# Patient Record
Sex: FEMALE | Race: WHITE | NOT HISPANIC OR LATINO | Employment: FULL TIME | URBAN - METROPOLITAN AREA
[De-identification: names, ages, dates, MRNs, and addresses within clinical notes are randomized per-mention and may not be internally consistent; named-entity substitution may affect disease eponyms.]

---

## 2019-04-16 ENCOUNTER — TRANSCRIBE ORDERS (OUTPATIENT)
Dept: ADMINISTRATIVE | Age: 49
End: 2019-04-16

## 2019-04-16 ENCOUNTER — APPOINTMENT (OUTPATIENT)
Dept: LAB | Age: 49
End: 2019-04-16
Payer: COMMERCIAL

## 2019-04-16 DIAGNOSIS — N39.0 URINARY TRACT INFECTION WITHOUT HEMATURIA, SITE UNSPECIFIED: Primary | ICD-10-CM

## 2019-04-16 DIAGNOSIS — N39.0 URINARY TRACT INFECTION WITHOUT HEMATURIA, SITE UNSPECIFIED: ICD-10-CM

## 2019-04-16 LAB
BACTERIA UR QL AUTO: ABNORMAL /HPF
BILIRUB UR QL STRIP: NEGATIVE
CLARITY UR: ABNORMAL
COLOR UR: YELLOW
GLUCOSE UR STRIP-MCNC: NEGATIVE MG/DL
HGB UR QL STRIP.AUTO: ABNORMAL
HYALINE CASTS #/AREA URNS LPF: ABNORMAL /LPF
KETONES UR STRIP-MCNC: NEGATIVE MG/DL
LEUKOCYTE ESTERASE UR QL STRIP: ABNORMAL
NITRITE UR QL STRIP: POSITIVE
NON-SQ EPI CELLS URNS QL MICRO: ABNORMAL /HPF
PH UR STRIP.AUTO: 6 [PH]
PROT UR STRIP-MCNC: ABNORMAL MG/DL
RBC #/AREA URNS AUTO: ABNORMAL /HPF
SP GR UR STRIP.AUTO: 1.02 (ref 1–1.03)
UROBILINOGEN UR QL STRIP.AUTO: 0.2 E.U./DL
WBC #/AREA URNS AUTO: ABNORMAL /HPF

## 2019-04-16 PROCEDURE — 87077 CULTURE AEROBIC IDENTIFY: CPT

## 2019-04-16 PROCEDURE — 81001 URINALYSIS AUTO W/SCOPE: CPT | Performed by: INTERNAL MEDICINE

## 2019-04-16 PROCEDURE — 87086 URINE CULTURE/COLONY COUNT: CPT

## 2019-04-16 PROCEDURE — 87186 SC STD MICRODIL/AGAR DIL: CPT

## 2019-04-18 LAB — BACTERIA UR CULT: ABNORMAL

## 2019-04-23 ENCOUNTER — OFFICE VISIT (OUTPATIENT)
Dept: GYNECOLOGY | Facility: CLINIC | Age: 49
End: 2019-04-23
Payer: COMMERCIAL

## 2019-04-23 VITALS
HEIGHT: 67 IN | SYSTOLIC BLOOD PRESSURE: 102 MMHG | BODY MASS INDEX: 31.11 KG/M2 | WEIGHT: 198.2 LBS | HEART RATE: 62 BPM | DIASTOLIC BLOOD PRESSURE: 66 MMHG

## 2019-04-23 DIAGNOSIS — Z11.3 ROUTINE SCREENING FOR STI (SEXUALLY TRANSMITTED INFECTION): ICD-10-CM

## 2019-04-23 DIAGNOSIS — B96.89 BV (BACTERIAL VAGINOSIS): Primary | ICD-10-CM

## 2019-04-23 DIAGNOSIS — N76.0 BV (BACTERIAL VAGINOSIS): Primary | ICD-10-CM

## 2019-04-23 PROCEDURE — 99204 OFFICE O/P NEW MOD 45 MIN: CPT | Performed by: NURSE PRACTITIONER

## 2019-04-23 PROCEDURE — 87491 CHLMYD TRACH DNA AMP PROBE: CPT | Performed by: NURSE PRACTITIONER

## 2019-04-23 PROCEDURE — 87210 SMEAR WET MOUNT SALINE/INK: CPT | Performed by: NURSE PRACTITIONER

## 2019-04-23 PROCEDURE — 87591 N.GONORRHOEAE DNA AMP PROB: CPT | Performed by: NURSE PRACTITIONER

## 2019-04-23 RX ORDER — METRONIDAZOLE 7.5 MG/G
1 GEL VAGINAL DAILY
Qty: 5 G | Refills: 0 | Status: SHIPPED | OUTPATIENT
Start: 2019-04-23 | End: 2019-04-28

## 2019-04-23 RX ORDER — LEVOTHYROXINE SODIUM 50 MCG
TABLET ORAL
COMMUNITY
Start: 2019-02-04 | End: 2021-04-30

## 2019-04-25 LAB
C TRACH DNA SPEC QL NAA+PROBE: NEGATIVE
N GONORRHOEA DNA SPEC QL NAA+PROBE: NEGATIVE

## 2019-05-03 LAB
HIV 1+2 AB+HIV1 P24 AG SERPL QL IA: NON REACTIVE
HSV1 IGG SER IA-ACNC: 26.1 INDEX (ref 0–0.9)
HSV1 IGM TITR SER IF: NORMAL TITER
HSV2 IGG SER IA-ACNC: <0.91 INDEX (ref 0–0.9)
HSV2 IGM TITR SER IF: NORMAL TITER
RPR SER QL: NON REACTIVE

## 2019-06-14 ENCOUNTER — TELEPHONE (OUTPATIENT)
Dept: GYNECOLOGY | Facility: CLINIC | Age: 49
End: 2019-06-14

## 2019-06-14 DIAGNOSIS — B96.89 BV (BACTERIAL VAGINOSIS): Primary | ICD-10-CM

## 2019-06-14 DIAGNOSIS — N76.0 BV (BACTERIAL VAGINOSIS): Primary | ICD-10-CM

## 2019-06-14 RX ORDER — METRONIDAZOLE 500 MG/1
500 TABLET ORAL EVERY 12 HOURS SCHEDULED
Qty: 14 TABLET | Refills: 0 | Status: SHIPPED | OUTPATIENT
Start: 2019-06-14 | End: 2019-06-21

## 2019-07-02 ENCOUNTER — ANNUAL EXAM (OUTPATIENT)
Dept: GYNECOLOGY | Facility: CLINIC | Age: 49
End: 2019-07-02
Payer: COMMERCIAL

## 2019-07-02 VITALS
WEIGHT: 199.4 LBS | HEART RATE: 77 BPM | DIASTOLIC BLOOD PRESSURE: 76 MMHG | SYSTOLIC BLOOD PRESSURE: 110 MMHG | HEIGHT: 67 IN | BODY MASS INDEX: 31.3 KG/M2

## 2019-07-02 DIAGNOSIS — B96.89 BV (BACTERIAL VAGINOSIS): ICD-10-CM

## 2019-07-02 DIAGNOSIS — Z12.31 ENCOUNTER FOR SCREENING MAMMOGRAM FOR BREAST CANCER: ICD-10-CM

## 2019-07-02 DIAGNOSIS — N76.0 BV (BACTERIAL VAGINOSIS): ICD-10-CM

## 2019-07-02 DIAGNOSIS — Z01.411 ENCNTR FOR GYN EXAM (GENERAL) (ROUTINE) W ABNORMAL FINDINGS: Primary | ICD-10-CM

## 2019-07-02 DIAGNOSIS — N39.3 SUI (STRESS URINARY INCONTINENCE, FEMALE): ICD-10-CM

## 2019-07-02 PROCEDURE — G0145 SCR C/V CYTO,THINLAYER,RESCR: HCPCS | Performed by: NURSE PRACTITIONER

## 2019-07-02 PROCEDURE — S0612 ANNUAL GYNECOLOGICAL EXAMINA: HCPCS | Performed by: NURSE PRACTITIONER

## 2019-07-02 PROCEDURE — 87624 HPV HI-RISK TYP POOLED RSLT: CPT | Performed by: NURSE PRACTITIONER

## 2019-07-02 PROCEDURE — 87210 SMEAR WET MOUNT SALINE/INK: CPT | Performed by: NURSE PRACTITIONER

## 2019-07-02 RX ORDER — DIPHENOXYLATE HYDROCHLORIDE AND ATROPINE SULFATE 2.5; .025 MG/1; MG/1
1 TABLET ORAL DAILY
COMMUNITY

## 2019-07-02 RX ORDER — CLINDAMYCIN HYDROCHLORIDE 300 MG/1
300 CAPSULE ORAL 2 TIMES DAILY
Qty: 14 CAPSULE | Refills: 0 | Status: SHIPPED | OUTPATIENT
Start: 2019-07-02 | End: 2019-07-09

## 2019-07-02 NOTE — PROGRESS NOTES
Assessment/Plan:     Calcium 1000 mg + 600 IU Vit D daily  Pap with HR HPV Q 3 years-done, annual mammogram, monthly BSE  Exercise 150 minutes per week minimum  Kegels 20 times twice daily  Referred to PF PT for ADRIANA  Bacterial vaginosis noted on wet mount  Rx sent to requested pharmacy  No sex during treatment  Complete all medication as directed  Call with any recurrence of symptoms  PHQ-2 score of 2 which she relates to her lack of sleep  Diagnoses and all orders for this visit:    Encntr for gyn exam (general) (routine) w abnormal findings  -     Liquid-based pap, screening    BV (bacterial vaginosis)  -     clindamycin (CLEOCIN) 300 MG capsule; Take 1 capsule (300 mg total) by mouth 2 (two) times a day for 7 days    Encounter for screening mammogram for breast cancer  -     Mammo screening bilateral w 3d & cad; Future    ADRIANA (stress urinary incontinence, female)  -     Ambulatory referral to Physical Therapy; Future    Other orders  -     multivitamin (THERAGRAN) TABS; Take 1 tablet by mouth daily  -     Omega-3 Fatty Acids (FISH OIL PO); Take by mouth  -     Glucosamine HCl (GLUCOSAMINE PO); Take by mouth  -     MAGNESIUM PO; Take by mouth              Subjective:      Patient ID: Isha Temple is a 57 y o  female  Isha Temple is a 57 y o  female who is here today for her annual visit  No health concerns  Was treated recently for BV  Concerned about it occurring again and wants to be checked  Does not typically have a vaginal discharge and does not have any currently  Taking daily probiotic  Monthly menses x 4-5 days with mod flow  Menses is acceptable  Isha Temple is sexually active with male partner of 4 months  No condom use  Both were tested for STI's  Noticing light pink discharge post coital x 2 weeks  Does not occur every time  Exercising 2-3 times per week  Dealign with insomnia often  Admits to intermittent ADRIANA         The following portions of the patient's history were reviewed and updated as appropriate: allergies, current medications, past family history, past medical history, past social history, past surgical history and problem list     Review of Systems   Constitutional: Negative  Negative for activity change, appetite change, chills, diaphoresis, fatigue, fever and unexpected weight change  HENT: Negative for congestion, dental problem, sneezing, sore throat and trouble swallowing  Eyes: Negative for visual disturbance  Respiratory: Negative for chest tightness and shortness of breath  Cardiovascular: Negative for chest pain and leg swelling  Gastrointestinal: Negative for abdominal pain, constipation, diarrhea, nausea and vomiting  Genitourinary: Positive for vaginal bleeding and vaginal discharge  Negative for difficulty urinating, dyspareunia, dysuria, frequency, hematuria, menstrual problem, pelvic pain, urgency and vaginal pain  Musculoskeletal: Negative for back pain and neck pain  Skin: Negative  Allergic/Immunologic: Negative  Neurological: Negative for weakness and headaches  Hematological: Negative for adenopathy  Psychiatric/Behavioral: Negative  Objective:      /76 (BP Location: Left arm, Patient Position: Sitting, Cuff Size: Standard)   Pulse 77   Ht 5' 7" (1 702 m)   Wt 90 4 kg (199 lb 6 4 oz)   LMP 06/14/2019   BMI 31 23 kg/m²          Physical Exam   Constitutional: She is oriented to person, place, and time  Vital signs are normal  She appears well-developed and well-nourished  HENT:   Head: Normocephalic and atraumatic  Eyes: Right eye exhibits no discharge  Left eye exhibits no discharge  Neck: Trachea normal and normal range of motion  Neck supple  No thyromegaly present  Cardiovascular: Normal rate, regular rhythm, normal heart sounds and intact distal pulses     Pulmonary/Chest: Effort normal and breath sounds normal  Right breast exhibits no inverted nipple, no mass, no nipple discharge, no skin change and no tenderness  Left breast exhibits no inverted nipple, no mass, no nipple discharge, no skin change and no tenderness  No breast tenderness, discharge or bleeding  Breasts are symmetrical    Abdominal: Soft  Normal appearance  Genitourinary: Vagina normal and uterus normal  Rectal exam shows no external hemorrhoid  No breast tenderness, discharge or bleeding  Pelvic exam was performed with patient supine  No labial fusion  There is no rash, tenderness, lesion or injury on the right labia  There is no rash, tenderness, lesion or injury on the left labia  Cervix exhibits no motion tenderness, no discharge and no friability  Right adnexum displays no mass, no tenderness and no fullness  Left adnexum displays no mass, no tenderness and no fullness  No erythema, tenderness or bleeding in the vagina  No foreign body in the vagina  No signs of injury around the vagina  No vaginal discharge found  Genitourinary Comments: Guarded during exam  Pelvic tone 4/5   Musculoskeletal: Normal range of motion  Lymphadenopathy:        Head (right side): No submental, no submandibular and no tonsillar adenopathy present  Head (left side): No submental, no submandibular and no tonsillar adenopathy present  She has no cervical adenopathy  She has no axillary adenopathy  No inguinal adenopathy noted on the right or left side  Right: No inguinal adenopathy present  Left: No inguinal adenopathy present  Neurological: She is alert and oriented to person, place, and time  Skin: Skin is warm and dry  Psychiatric: She has a normal mood and affect  Nursing note and vitals reviewed

## 2019-07-02 NOTE — PATIENT INSTRUCTIONS
Calcium 1000 mg + 600 IU Vit D daily  Pap with HR HPV Q 3 years-done, annual mammogram, monthly BSE  Exercise 150 minutes per week minimum  Kegels 20 times twice daily  Referred to PF PT  Bacterial vaginosis noted on wet mount  Rx sent to requested pharmacy  No sex during treatment  Complete all medication as directed  Call with any recurrence of symptoms

## 2019-07-05 LAB
HPV HR 12 DNA CVX QL NAA+PROBE: NEGATIVE
HPV16 DNA CVX QL NAA+PROBE: NEGATIVE
HPV18 DNA CVX QL NAA+PROBE: POSITIVE

## 2019-07-10 LAB
LAB AP GYN PRIMARY INTERPRETATION: NORMAL
Lab: NORMAL

## 2019-07-23 ENCOUNTER — OFFICE VISIT (OUTPATIENT)
Dept: GYNECOLOGY | Facility: CLINIC | Age: 49
End: 2019-07-23
Payer: COMMERCIAL

## 2019-07-23 VITALS
SYSTOLIC BLOOD PRESSURE: 112 MMHG | DIASTOLIC BLOOD PRESSURE: 76 MMHG | HEART RATE: 87 BPM | WEIGHT: 200.4 LBS | BODY MASS INDEX: 31.39 KG/M2

## 2019-07-23 DIAGNOSIS — N89.8 LEUKORRHEA: ICD-10-CM

## 2019-07-23 DIAGNOSIS — R35.0 URINARY FREQUENCY: Primary | ICD-10-CM

## 2019-07-23 LAB
SL AMB  POCT GLUCOSE, UA: ABNORMAL
SL AMB LEUKOCYTE ESTERASE,UA: ABNORMAL
SL AMB POCT BILIRUBIN,UA: ABNORMAL
SL AMB POCT BLOOD,UA: ABNORMAL
SL AMB POCT CLARITY,UA: ABNORMAL
SL AMB POCT COLOR,UA: YELLOW
SL AMB POCT KETONES,UA: ABNORMAL
SL AMB POCT NITRITE,UA: ABNORMAL
SL AMB POCT PH,UA: 6
SL AMB POCT SPECIFIC GRAVITY,UA: 1.01
SL AMB POCT URINE PROTEIN: ABNORMAL
SL AMB POCT UROBILINOGEN: 0.2

## 2019-07-23 PROCEDURE — 87147 CULTURE TYPE IMMUNOLOGIC: CPT | Performed by: NURSE PRACTITIONER

## 2019-07-23 PROCEDURE — 87181 SC STD AGAR DILUTION PER AGT: CPT | Performed by: NURSE PRACTITIONER

## 2019-07-23 PROCEDURE — 87210 SMEAR WET MOUNT SALINE/INK: CPT | Performed by: NURSE PRACTITIONER

## 2019-07-23 PROCEDURE — 87086 URINE CULTURE/COLONY COUNT: CPT | Performed by: NURSE PRACTITIONER

## 2019-07-23 PROCEDURE — 81002 URINALYSIS NONAUTO W/O SCOPE: CPT | Performed by: NURSE PRACTITIONER

## 2019-07-23 PROCEDURE — 99214 OFFICE O/P EST MOD 30 MIN: CPT | Performed by: NURSE PRACTITIONER

## 2019-07-23 RX ORDER — NITROFURANTOIN 25; 75 MG/1; MG/1
CAPSULE ORAL
Qty: 14 CAPSULE | Refills: 0 | Status: SHIPPED | OUTPATIENT
Start: 2019-07-23 | End: 2021-05-21

## 2019-07-23 NOTE — PATIENT INSTRUCTIONS
Normal wet mount-no infection  Continue to take daily probiotic  Bathe daily with dove bar soap  Antibiotic sent to pharmacy on file;start today  Urine sent for culture

## 2019-07-23 NOTE — PROGRESS NOTES
Assessment/Plan:     Normal wet mount-no infection  Continue to take daily probiotic  Bathe daily with dove bar soap  Antibiotic sent to pharmacy on file;start today  Urine sent for culture      Diagnoses and all orders for this visit:    Urinary frequency  -     POCT urine dip  -     nitrofurantoin (MACROBID) 100 mg capsule; Take one tablet po every 12 hours with food    Leukorrhea  -     POCT wet mount              Subjective:      Patient ID: Jennifer Loera is a 57 y o  female  Jennifer Loera is a 57 y o  female who is here today for a problem visit  She is here today for urinary urgency, burning, and frequency since yesterday  No hematuria or discoloration of  her urine  No vaginal bleeding currently  Notices milky discharge and slight smell to vaginal discharge x weeks  She fears she has BV  Started probiotics as suggested one month ago  Patient states that she has been treated BV 3 times since April  Monthly menses x 21-22 days, lasting 4-5 days  with moderate flow  Menses is acceptable  Jennifer Loera is sexually active with male partner of 4 months  No condom use  Pt has essure coils for birth control  Right side failed and tube removal was performed  The following portions of the patient's history were reviewed and updated as appropriate: allergies, current medications, past family history, past medical history, past social history, past surgical history and problem list     Review of Systems   Constitutional: Negative  Gastrointestinal: Negative for abdominal pain, constipation, diarrhea, nausea and vomiting  Genitourinary: Positive for difficulty urinating, dysuria, urgency and vaginal discharge  Negative for decreased urine volume, dyspareunia, flank pain, genital sores, hematuria, menstrual problem, pelvic pain, vaginal bleeding and vaginal pain  Musculoskeletal: Negative for arthralgias and myalgias  Skin: Negative  Hematological: Negative for adenopathy  Psychiatric/Behavioral: Negative  All other systems reviewed and are negative  Objective:      /76 (BP Location: Left arm, Patient Position: Sitting, Cuff Size: Standard)   Pulse 87   Wt 90 9 kg (200 lb 6 4 oz)   LMP 07/07/2019   BMI 31 39 kg/m²          Physical Exam   Constitutional: She is oriented to person, place, and time  She appears well-developed and well-nourished  Genitourinary: Uterus normal  Rectal exam shows no external hemorrhoid  Pelvic exam was performed with patient supine  No labial fusion  There is no rash, tenderness, lesion or injury on the right labia  There is no rash, tenderness, lesion or injury on the left labia  Cervix exhibits no motion tenderness, no discharge and no friability  Right adnexum displays no mass, no tenderness and no fullness  Left adnexum displays no mass, no tenderness and no fullness  No erythema, tenderness or bleeding in the vagina  No foreign body in the vagina  No signs of injury around the vagina  Vaginal discharge (white colored, no malodor) found  Musculoskeletal: Normal range of motion  Lymphadenopathy: No inguinal adenopathy noted on the right or left side  Right: No inguinal adenopathy present  Left: No inguinal adenopathy present  Neurological: She is alert and oriented to person, place, and time  Skin: Skin is warm and dry  Psychiatric: She has a normal mood and affect  Nursing note and vitals reviewed

## 2019-07-30 ENCOUNTER — TELEPHONE (OUTPATIENT)
Dept: GYNECOLOGY | Facility: CLINIC | Age: 49
End: 2019-07-30

## 2019-07-30 LAB
BACTERIA UR CULT: ABNORMAL
BACTERIA UR CULT: ABNORMAL

## 2019-10-02 ENCOUNTER — EVALUATION (OUTPATIENT)
Dept: PHYSICAL THERAPY | Facility: REHABILITATION | Age: 49
End: 2019-10-02
Payer: COMMERCIAL

## 2019-10-02 DIAGNOSIS — N39.3 SUI (STRESS URINARY INCONTINENCE, FEMALE): ICD-10-CM

## 2019-10-02 PROCEDURE — 97161 PT EVAL LOW COMPLEX 20 MIN: CPT | Performed by: PHYSICAL THERAPIST

## 2019-10-02 NOTE — PROGRESS NOTES
PT Evaluation     Today's date: 10/2/2019  Patient name: Penelope Foster  : 1970  MRN: [id number]  Referring provider: Carmelia Lesches, CRNP  Dx:   Encounter Diagnosis     ICD-10-CM    1  ADRIANA (stress urinary incontinence, female) N39 3 Ambulatory referral to Physical Therapy                 Assessment  Assessment details: Penelope Foster is a 57 y o  female who presents with concerns of severe urinary urgency  Patient presents with the below outlined deficits and is appropriate for skilled physical therapy in order to address deficits and ultimately meet goal of independent self management of condition  Therapeutic activities performed upon examination included education regarding pelvic floor anatomy, explanation of exam technique, issuance of 4 day bladder diaries and discussion of treatment plan as well as expectations of the patient to emphasize the importance of compliance and adherence to physical therapy visits  Impairments: activity intolerance, lacks appropriate home exercise program and poor body mechanics  Understanding of Dx/Px/POC: good   Prognosis: good    Goals  STGs to be met in 4 weeks:  * Patient will be compliant with introductory HEP as prescribed  * Presents with improved understanding of bladder irritants in diet and makes concerted effort to reduce them by at least 50-75%  LTGs to be met by discharge:  * Patient will present with a 50 % improvement on FOTO score by discharge to indicate improved symptom management  * Patient reports that she is able to successfully suppress an urge to empty her bladder for 20' without leakage  * Normalize sEMG findings to indicate strength average > 12uV and resting average < 2 5uV  * Presents with improved fluid intake to avoid concentrated and irritating urine by discharge  * Presents with improved nocturia to 1 toiletings/night for more thorough sleep     * Patient implements urge suppression strategies throughout the day and reports that her average voiding interval is 2+ hours upon discharge  * Patient will be compliant with comprehensive home exercise program for self management of condition  Plan  Patient would benefit from: skilled physical therapy  Referral necessary: No  Planned modality interventions: biofeedback  Planned therapy interventions: manual therapy, neuromuscular re-education, patient education, therapeutic activities, therapeutic exercise and home exercise program  Frequency: 1x week  Duration in weeks: 12  Plan of Care beginning date: 10/2/2019  Plan of Care expiration date: 12/25/2019  Treatment plan discussed with: patient        PT Pelvic Floor Subjective:   History of Present Illness:   Patient reports that she has a sudden urge to empty bladder "out of the blue " She reports "this is something I have created - I've been dealing with it for years " Urges are more concerned at work and out in public "My life has ruled by this "     She is seeing a therapist about this issue           Recurrent probem    Quality of life: fair    Social Support:     Lives with:  Alone    Relationship status: /committed    Work status: employed full time ([employer])  Diet and Exercise:    Diet:balanced nutrition    Just joined gym - plans to begin walking program and goal is 3-4/week    45' walking  OB/ gyn History    Gestational History:     Prior Pregnancy: No      Menstrual History:      Menstrual irregularities regular menses    Painful periods:  No difficulty managing menstrual pain (mod cramps)    Tolerates tampons: yes  Tubal ligation - e-sure (~2015)  Bladder Function:     Voiding Difficulties positive for: urgency and frequent urination      Voiding Difficulties comments:     Voiding frequency: every 3-4 hours and every 1-2 hours (frequency varies - at home ~ 2-3 hrs, at work ~ 2 hrs )    Urinary leakage: urine leakage    Urinary leakage aggravated by: coughing, sneezing, bending, exercise and walking to the bathroom    Nocturia (episodes per night): 2    Painful urination: No      Intake (ounces): Water: 40 ("I don't drink much at work and drink more at night "), Coffee: 20, Alcohol intake (oz): red wine - 4-5 glasses  Intake (ounces) comment: Sarah water   "I will dehydrate myself if I'm at work "   Bowel Function:     Bowel frequency: daily    [place] Stool Scale: type 4  Sexual Function:     Sexually Active:  Non-contributory    Pain during intercourse: No    Pain:     Current pain ratin    At best pain ratin    At worst pain ratin    Onset:  More than 2 years ago  Diagnostic Tests:     None    Treatments:     None    Patient Goals:     Patient goals for therapy:  Improved quality of life, relaxation, improved comfort and improved bladder or bowel function      Objective     Static Posture     Comments  Forward head/ rounded shoulders  Increased lumbar lordosis  Flattened kyphosis  Pelvic Floor Exam     General Perineum Exam:     General perineum exam comments: Patient was unprepared for pelvic examination on evaluation  Deferred to second visit for this reason                 Precautions: standard    Patient education: pelvic floor anatomy review, urge suppression strategies and issued bladder diaries (review NV)    Manual  10/2            Pelvic floor examination nv                                                                    Exercise Diary  10/2            NMR via biofeedback nv                                                                                                                                                                                                                                                                       Modalities

## 2019-10-09 ENCOUNTER — APPOINTMENT (OUTPATIENT)
Dept: PHYSICAL THERAPY | Facility: REHABILITATION | Age: 49
End: 2019-10-09
Payer: COMMERCIAL

## 2019-10-14 NOTE — PROGRESS NOTES
Daily Note     Today's date: 10/14/2019  Patient name: Juan Payne  : 1970  MRN: [id number]  Referring provider: TITUS Olmstead  Dx:   Encounter Diagnosis     ICD-10-CM    1  ADRIANA (stress urinary incontinence, female) N39 3      From evaluation: Patient reports that she has a sudden urge to empty bladder "out of the blue " She reports "this is something I have created - I've been dealing with it for years " Urges are more concerned at work and out in public "My life has ruled by this "      She is seeing a therapist about this issue  Subjective: ***      Objective: See treatment diary below      Assessment: Tolerated treatment {Tolerated treatment :}   Patient {assessment:}      Plan: {PLAN:[id number]}     Precautions: standard    Patient education: pelvic floor anatomy review, urge suppression strategies and issued bladder diaries (review NV)    Manual  10/2            Pelvic floor examination nv                                                                    Exercise Diary  10/2            NMR via biofeedback nv                                                                                                                                                                                                                                                                       Modalities

## 2019-10-15 ENCOUNTER — APPOINTMENT (OUTPATIENT)
Dept: PHYSICAL THERAPY | Facility: REHABILITATION | Age: 49
End: 2019-10-15
Payer: COMMERCIAL

## 2019-10-21 NOTE — PROGRESS NOTES
Daily Note     Today's date: 10/21/2019  Patient name: Rigoberto Grijalva  : 1970  MRN: [id number]  Referring provider: TITUS Knight  Dx:   Encounter Diagnosis     ICD-10-CM    1  ADRIANA (stress urinary incontinence, female) N39 3      From evaluation: Patient reports that she has a sudden urge to empty bladder "out of the blue " She reports "this is something I have created - I've been dealing with it for years " Urges are more concerned at work and out in public "My life has ruled by this "      She is seeing a therapist about this issue  Subjective: ***      Objective: See treatment diary below      Assessment: Tolerated treatment {Tolerated treatment :}   Patient {assessment:}      Plan: {PLAN:[id number]}     Precautions: standard    Patient education: pelvic floor anatomy review, urge suppression strategies and issued bladder diaries (review NV)    Manual  10/2            Pelvic floor examination nv                                                                    Exercise Diary  10/2            NMR via biofeedback nv                                                                                                                                                                                                                                                                       Modalities

## 2019-10-22 ENCOUNTER — APPOINTMENT (OUTPATIENT)
Dept: PHYSICAL THERAPY | Facility: REHABILITATION | Age: 49
End: 2019-10-22
Payer: COMMERCIAL

## 2019-10-28 NOTE — PROGRESS NOTES
Daily Note     Today's date: 10/30/2019  Patient name: Gomez Gonzales  : 1970  MRN: [id number]  Referring provider: TITUS Davila  Dx:   Encounter Diagnosis     ICD-10-CM    1  ADRIANA (stress urinary incontinence, female) N39 3      From evaluation: Patient reports that she has a sudden urge to empty bladder "out of the blue " She reports "this is something I have created - I've been dealing with it for years " Urges are more concerned at work and out in public "My life has ruled by this "      She is seeing a therapist about this issue  Subjective: Patient returns after a few weeks due to schedule conflicts  She returns with 3 days of bladder diaries completed  Objective: See treatment diary below    Pelvic floor strength:  Power: 3+/5  Endurance: 10 seconds  Reps @ 6 seconds: 10  Fast holds in 10 seconds: 5      Assessment: Tolerated treatment well  Patient would benefit from continued PT  Bladder diaries reveal adequate fluid intake with low urine volumes  Demonstrates fairly good PFM strength although would benefit from BF assessment nv  Plan: Continue per plan of care        Precautions: standard    10/30/19: TA x 25' to review bladder diaries, discuss urge suppression strategies and discuss visualization techniques     Manual  10/2 10/30           Pelvic floor examination nv 25' see above                                                                   Exercise Diary  10/2  10/30           NMR via biofeedback nv nv                        PFM slow holds   5' w/ exhale           PFM quick holds  5'                                                                                                                                                                                                                               Modalities

## 2019-10-29 ENCOUNTER — APPOINTMENT (OUTPATIENT)
Dept: PHYSICAL THERAPY | Facility: REHABILITATION | Age: 49
End: 2019-10-29
Payer: COMMERCIAL

## 2019-10-30 ENCOUNTER — OFFICE VISIT (OUTPATIENT)
Dept: PHYSICAL THERAPY | Facility: REHABILITATION | Age: 49
End: 2019-10-30
Payer: COMMERCIAL

## 2019-10-30 DIAGNOSIS — N39.3 SUI (STRESS URINARY INCONTINENCE, FEMALE): Primary | ICD-10-CM

## 2019-10-30 PROCEDURE — 97110 THERAPEUTIC EXERCISES: CPT | Performed by: PHYSICAL THERAPIST

## 2019-10-30 PROCEDURE — 97530 THERAPEUTIC ACTIVITIES: CPT | Performed by: PHYSICAL THERAPIST

## 2019-10-30 PROCEDURE — 97140 MANUAL THERAPY 1/> REGIONS: CPT | Performed by: PHYSICAL THERAPIST

## 2019-11-05 ENCOUNTER — OFFICE VISIT (OUTPATIENT)
Dept: PHYSICAL THERAPY | Facility: REHABILITATION | Age: 49
End: 2019-11-05
Payer: COMMERCIAL

## 2019-11-05 DIAGNOSIS — N39.3 SUI (STRESS URINARY INCONTINENCE, FEMALE): Primary | ICD-10-CM

## 2019-11-05 PROCEDURE — 97530 THERAPEUTIC ACTIVITIES: CPT | Performed by: PHYSICAL THERAPIST

## 2019-11-05 PROCEDURE — 97112 NEUROMUSCULAR REEDUCATION: CPT | Performed by: PHYSICAL THERAPIST

## 2019-11-05 NOTE — PROGRESS NOTES
Daily Note     Today's date: 2019  Patient name: Steven Neves  : 1970  MRN: [id number]  Referring provider: TITUS Karimi  Dx:   Encounter Diagnosis     ICD-10-CM    1  ADRIANA (stress urinary incontinence, female) N39 3      From evaluation: Patient reports that she has a sudden urge to empty bladder "out of the blue " She reports "this is something I have created - I've been dealing with it for years " Urges are more concerned at work and out in public "My life has ruled by this "      She is seeing a therapist about this issue  Subjective: Patient reports that she had a stomach bug x 3 days and she didn't exercise during that time  She has been trying to slowly increase water intake at work  Objective: See treatment diary below    Biofeedback performed in supine hooklying  Patient presents with weak  Tone and poor holding endurance  Performed 5 second active PFM contractions followed by 10 seconds of rest for 4 repetitions  Muscle activity during work phase measured 6 7 uV on average with the goal being > 12 0uV and muscle activity during rest phase measured 1 0 uV on average with the goal being < 2 5uV  HEP: 12x12" then 12 for quick flicks; 3 times per day in sitting    Assessment: Tolerated treatment well  Patient would benefit from continued PT  Patient requires reassurance regarding "good days and bad days" fact that this is relatively common at the beginning of treatment and could be for a number of reasons  NV - progress co-contractions  Plan: Continue per plan of care  Precautions: standard    19: TA x 25' to discuss bladder irritants (issued food list), Prelief, and increased water intake  Patient with many questions      Manual  10/2 10/30           Pelvic floor examination nv 25' see above                                                                   Exercise Diary  10/2  10/30 11/5          NMR via biofeedback nv nv 30' see above PFM slow holds   5' w/ exhale           PFM quick holds  5'                                                                                                                                                                                                                               Modalities

## 2019-11-11 NOTE — PROGRESS NOTES
Daily Note     Today's date: 2019  Patient name: Aubrey Dowell  : 1970  MRN: [id number]  Referring provider: TITUS Aviles  Dx:   Encounter Diagnosis     ICD-10-CM    1  ADRIANA (stress urinary incontinence, female) N39 3      From evaluation: Patient reports that she has a sudden urge to empty bladder "out of the blue " She reports "this is something I have created - I've been dealing with it for years " Urges are more concerned at work and out in public "My life has ruled by this "      She is seeing a therapist about this issue  Subjective: Patient reports that she had a stomach bug x 3 days and she didn't exercise during that time  She has been trying to slowly increase water intake at work  Objective: See treatment diary below    Biofeedback performed in supine hooklying  Patient presents with weak  Tone and poor holding endurance  Performed 5 second active PFM contractions followed by 10 seconds of rest for 4 repetitions  Muscle activity during work phase measured 6 7 uV on average with the goal being > 12 0uV and muscle activity during rest phase measured 1 0 uV on average with the goal being < 2 5uV  HEP: 12x12" then 12 for quick flicks; 3 times per day in sitting    Assessment: Tolerated treatment well  Patient would benefit from continued PT  Patient requires reassurance regarding "good days and bad days" fact that this is relatively common at the beginning of treatment and could be for a number of reasons  NV - progress co-contractions  Plan: Continue per plan of care  Precautions: standard    19: TA x 25' to discuss bladder irritants (issued food list), Prelief, and increased water intake  Patient with many questions      Manual  10/2 10/30           Pelvic floor examination nv 25' see above                                                                   Exercise Diary  10/2  10/30 11/5          NMR via biofeedback nv nv 30' see above PFM slow holds   5' w/ exhale           PFM quick holds  5'                                                                                                                                                                                                                               Modalities

## 2019-11-12 ENCOUNTER — APPOINTMENT (OUTPATIENT)
Dept: PHYSICAL THERAPY | Facility: REHABILITATION | Age: 49
End: 2019-11-12
Payer: COMMERCIAL

## 2019-11-25 NOTE — PROGRESS NOTES
Daily Note     Today's date: 2019  Patient name: Nevin Barajas  : 1970  MRN: [id number]  Referring provider: TITUS Minaya  Dx:   Encounter Diagnosis     ICD-10-CM    1  ADRIANA (stress urinary incontinence, female) N39 3      From evaluation: Patient reports that she has a sudden urge to empty bladder "out of the blue " She reports "this is something I have created - I've been dealing with it for years " Urges are more concerned at work and out in public "My life has ruled by this "      She is seeing a therapist about this issue  Subjective: Patient reports that she has been compliant with her HEP 3 times a day  She reports that she feel that she has been feeling her TA engage with her pelvic floor  She is still dealing with fear of urinary urgency which is a "little bit better" at home but she is having trouble at work due to being on a phone call  that she had a stomach bug x 3 days and she didn't exercise during that time  She has been trying to slowly increase water intake at work  Objective: See treatment diary below    Biofeedback performed in supine hooklying  Patient presents with weak  Tone and poor holding endurance  Performed 5 second active PFM contractions followed by 10 seconds of rest for 4 repetitions  Muscle activity during work phase measured 6 7 uV on average with the goal being > 12 0uV and muscle activity during rest phase measured 1 0 uV on average with the goal being < 2 5uV  HEP: 12x12" then 12 for quick flicks; 3 times per day in sitting    Assessment: Tolerated treatment well  Patient would benefit from continued PT  Introduced TA, diaphragm and hip flexor co-contractions today with good response  Progressing well with strength and understanding  Lacks confidence that she can suppress urges  I encouraged her that she has a great prognosis for success  Plan: Continue per plan of care        Precautions: standard    19: TA x 25' to discuss bladder retraining and urge suppression strategies  Handout provided  Patient requested to perform 5 minute holds x 2 with quick flicks  Expressed understanding       Manual  10/2 10/30 11/26          Pelvic floor examination nv 25' see above                        PFM cuing and quick flicks   8'                                        Exercise Diary  10/2  10/30 11/5 11/26         NMR via biofeedback nv nv 30' see above 25' w/ exercise below                      PFM slow holds   5' w/ exhale           PFM quick holds  5'           PFM + TA    x3'         PFM+ TA + breath    x3'         PFM + breath + SLR    5x3 each         Quick flicks    N10                                                                                                                                                                         Modalities

## 2019-11-26 ENCOUNTER — OFFICE VISIT (OUTPATIENT)
Dept: PHYSICAL THERAPY | Facility: REHABILITATION | Age: 49
End: 2019-11-26
Payer: COMMERCIAL

## 2019-11-26 DIAGNOSIS — N39.3 SUI (STRESS URINARY INCONTINENCE, FEMALE): Primary | ICD-10-CM

## 2019-11-26 PROCEDURE — 97140 MANUAL THERAPY 1/> REGIONS: CPT | Performed by: PHYSICAL THERAPIST

## 2019-11-26 PROCEDURE — 97112 NEUROMUSCULAR REEDUCATION: CPT | Performed by: PHYSICAL THERAPIST

## 2019-11-26 PROCEDURE — 97530 THERAPEUTIC ACTIVITIES: CPT | Performed by: PHYSICAL THERAPIST

## 2020-01-23 ENCOUNTER — TELEPHONE (OUTPATIENT)
Dept: PHYSICAL THERAPY | Facility: REHABILITATION | Age: 50
End: 2020-01-23

## 2020-01-23 NOTE — TELEPHONE ENCOUNTER
Called patient at her request for more physical therapy appt  Left vm that first available would be 2/18 at 6pm  I also advised that we could consider transferring her care to our [facility] office if she would like to get in sooner

## 2020-02-18 ENCOUNTER — OFFICE VISIT (OUTPATIENT)
Dept: PHYSICAL THERAPY | Facility: REHABILITATION | Age: 50
End: 2020-02-18
Payer: COMMERCIAL

## 2020-02-18 DIAGNOSIS — N39.3 STRESS INCONTINENCE, FEMALE: Primary | ICD-10-CM

## 2020-02-18 PROCEDURE — 97110 THERAPEUTIC EXERCISES: CPT | Performed by: PHYSICAL THERAPIST

## 2020-02-18 PROCEDURE — 97530 THERAPEUTIC ACTIVITIES: CPT | Performed by: PHYSICAL THERAPIST

## 2020-02-18 NOTE — PROGRESS NOTES
Daily Note / Discharge    Today's date: 2020  Patient name: Rayna Morin  : 1970  MRN: [id number]  Referring provider: TITUS Kam  Dx: No diagnosis found  Subjective: Patient returns and reports that she has "fallen off the wagon and I feel like I'm back to where I started with urinary stuff " She reports that she had a close friend die since she was here in late November and she fell into a depression and didn't take care of her self or do any exercises  Notes that she continues with poor hydration, frequent urination at work and nocturia up to 4 times a night  She wishes to resume exercises and work independently for at least 6 months  She brings a notebook with questions to discuss exercise techniques  Objective: See treatment diary below      Assessment: Tolerated treatment well  Patient exhibited good technique with therapeutic exercises and and is appropriate for discharge to self management at this time  We again discussed fluid recommendations, urge suppression strategies, bladder physiology and bladder retraining and she expressed understanding and stated "I'm ready to get control of this " She was issued HEP handouts and I also videotaped her doing each exercise on her phone so that she can review at home and be certain of correct technique  Patient was advised to call me with any questions or concerns  Goals  STGs to be met in 4 weeks:  * Patient will be compliant with introductory HEP as prescribed  MET  * Presents with improved understanding of bladder irritants in diet and makes concerted effort to reduce them by at least 50-75%  NOT MET    LTGs to be met by discharge:  * Patient will present with a 50 % improvement on FOTO score by discharge to indicate improved symptom management  NA  * Patient reports that she is able to successfully suppress an urge to empty her bladder for 20' without leakage   NOT MET  * Normalize sEMG findings to indicate strength average > 12uV and resting average < 2 5uV  NA  * Presents with improved fluid intake to avoid concentrated and irritating urine by discharge  NOT MET  * Presents with improved nocturia to 1 toiletings/night for more thorough sleep  NOT MET  * Patient implements urge suppression strategies throughout the day and reports that her average voiding interval is 2+ hours upon discharge  MET  * Patient will be compliant with comprehensive home exercise program for self management of condition  ONGOING      Plan: Discharge PT        Precautions: standard    2/18/20: TA x 25' as above    Manual  10/2 10/30 11/26          Pelvic floor examination nv 25' see above                        PFM cuing and quick flicks   8'                                        Exercise Diary  10/2  10/30 11/5 11/26 2/18        NMR via biofeedback nv nv 30' see above 25' w/ exercise below                      PFM slow holds   5' w/ exhale   5'        PFM quick holds  5'   5'        PFM + TA    x3' 3'        PFM+ TA + breath    x3' 3'        PFM + breath + SLR    5x3 each np        Quick flicks    L90 O20        PFM + breath + marches     3'        PFM + breath+ bridges     3'        PFM+ breath + hip abduction vs  BTB     3'        Review of proper "knack" w/ cough, sneexze and transitions     5'                                                                                                                    Modalities

## 2020-09-14 ENCOUNTER — TELEPHONE (OUTPATIENT)
Dept: GYNECOLOGY | Facility: CLINIC | Age: 50
End: 2020-09-14

## 2020-09-14 NOTE — TELEPHONE ENCOUNTER
She needs to be seen  BV usually does not cause pelvic pain  Can offer her a 4:00 today  If she still needs later in the day suggest [facility]

## 2020-09-14 NOTE — TELEPHONE ENCOUNTER
Pt called stating she had BV  I tried to schedule her but the next appointment isn't until after you come back  I had her call [facility] they could not get her in until mid October  Can you send something in for her  She is also having pelvic pain but she can not come in until after 4:30 due to her work schedule

## 2020-09-16 NOTE — PROGRESS NOTES
A/P:  54 y o  yo female with:  Diagnoses and all orders for this visit:    BV (bacterial vaginosis)  -     metroNIDAZOLE (FLAGYL) 500 mg tablet; Take 1 tablet (500 mg total) by mouth every 12 (twelve) hours for 7 days  -     POCT wet mount        1  Wet prep was obtained  Cultures for gonorrhea and chlamydia were not collected  Affirm was not obtained  2   Will contact pt with results  3  Patient was treated with metronidazole for 7 days and encouraged to try Rephresh vaginal gel after sex and period  CHIEF COMPLAINT: recurrent BV    HISTORY OF PRESENT ILLNESS:  Ms Radha Phillips is a 54 y o  yo Bella Mutter female who presents for vaginal discharge  She describes the discharge as watery, white and fishy odor  Her symptoms started 3 days ago  show no change    Alleviating factors: none   Aggravating factors: intercourse    ROS:   She denies hematuria, dysuria, constipation, diarrhea, fever, chills, nausea or emesis      Past Medical History:   Diagnosis Date    Eczema     Migraine     TIA (transient ischemic attack)        Past Medical History:   Diagnosis Date    Eczema     Migraine     TIA (transient ischemic attack)        Social History     Socioeconomic History    Marital status: Single     Spouse name: Not on file    Number of children: Not on file    Years of education: Not on file    Highest education level: Not on file   Occupational History    Not on file   Social Needs    Financial resource strain: Not on file    Food insecurity     Worry: Not on file     Inability: Not on file    Transportation needs     Medical: Not on file     Non-medical: Not on file   Tobacco Use    Smoking status: Former Smoker     Last attempt to quit: 2019     Years since quittin 7    Smokeless tobacco: Never Used   Substance and Sexual Activity    Alcohol use: Yes     Frequency: 2-3 times a week     Drinks per session: 1 or 2     Comment: Social    Drug use: Never    Sexual activity: Yes Partners: Male     Birth control/protection: Female Sterilization   Lifestyle    Physical activity     Days per week: Not on file     Minutes per session: Not on file    Stress: Not on file   Relationships    Social connections     Talks on phone: Not on file     Gets together: Not on file     Attends [employer] service: Not on file     Active member of club or organization: Not on file     Attends meetings of clubs or organizations: Not on file     Relationship status: Not on file    Intimate partner violence     Fear of current or ex partner: Not on file     Emotionally abused: Not on file     Physically abused: Not on file     Forced sexual activity: Not on file   Other Topics Concern    Not on file   Social History Narrative    Not on file       There were no vitals taken for this visit  GEN: The patient was alert and oriented x3, pleasant well-appearing female in no acute distress  Pelvic: Normal appearing external female genitalia, normal vaginal epithelium, normalappearing cervix  positive discharge noted        Wet Prep: positive clue cells, pH not valid do to some menstrual blood, positive whiff test

## 2020-09-17 ENCOUNTER — OFFICE VISIT (OUTPATIENT)
Dept: OBGYN CLINIC | Facility: MEDICAL CENTER | Age: 50
End: 2020-09-17
Payer: COMMERCIAL

## 2020-09-17 VITALS
BODY MASS INDEX: 32.47 KG/M2 | SYSTOLIC BLOOD PRESSURE: 130 MMHG | DIASTOLIC BLOOD PRESSURE: 72 MMHG | TEMPERATURE: 98 F | HEIGHT: 67 IN | WEIGHT: 206.9 LBS

## 2020-09-17 DIAGNOSIS — N76.0 BV (BACTERIAL VAGINOSIS): Primary | ICD-10-CM

## 2020-09-17 DIAGNOSIS — B96.89 BV (BACTERIAL VAGINOSIS): Primary | ICD-10-CM

## 2020-09-17 LAB
BV WHIFF TEST VAG QL: POSITIVE
CLUE CELLS SPEC QL WET PREP: ABNORMAL
PH SMN: ABNORMAL [PH]
SL AMB POCT WET MOUNT: ABNORMAL
T VAGINALIS VAG QL WET PREP: NEGATIVE
YEAST VAG QL WET PREP: NEGATIVE

## 2020-09-17 PROCEDURE — 99202 OFFICE O/P NEW SF 15 MIN: CPT | Performed by: ADVANCED PRACTICE MIDWIFE

## 2020-09-17 PROCEDURE — 87210 SMEAR WET MOUNT SALINE/INK: CPT | Performed by: ADVANCED PRACTICE MIDWIFE

## 2020-09-17 RX ORDER — METRONIDAZOLE 500 MG/1
500 TABLET ORAL EVERY 12 HOURS SCHEDULED
Qty: 14 TABLET | Refills: 0 | Status: SHIPPED | OUTPATIENT
Start: 2020-09-17 | End: 2020-09-24

## 2020-09-17 NOTE — PATIENT INSTRUCTIONS
Bacterial Vaginosis   WHAT YOU NEED TO KNOW:   Bacterial vaginosis (BV) is an infection in the vagina  It may cause vaginitis, which is irritation and inflammation of the vagina  DISCHARGE INSTRUCTIONS:   Medicines:   · Antibiotics: These are given to kill the bacteria that cause BV  They may be given as a pill or a cream to put in your vagina  Take or use as directed  · Take your medicine as directed  Contact your healthcare provider if you think your medicine is not helping or if you have side effects  Tell him of her if you are allergic to any medicine  Keep a list of the medicines, vitamins, and herbs you take  Include the amounts, and when and why you take them  Bring the list or the pill bottles to follow-up visits  Carry your medicine list with you in case of an emergency  Prevent bacterial vaginosis:   · Keep your vaginal area clean and dry:  Wear underwear and pantyhose with a cotton crotch  Wipe from front to back after you urinate or have a bowel movement  After bathing, rinse soap from your vaginal area to decrease your risk for irritation  · Do not use products that cause irritation:  Always use unscented tampons or sanitary pads  Do not use feminine sprays, powders, or scented tampons because they may cause irritation and increase your risk of BV  Detergents and fabric softeners may also cause irritation  · Do not douche: This can cause an imbalance in healthy vaginal bacteria  · Use latex condoms: This helps prevent another infection and keeps your partner from getting the infection  Contact your healthcare provider if:   · Your symptoms come back or do not improve with treatment  · You have vaginal bleeding that is not your monthly period  · You have questions or concerns about your condition or care  © 2017 [employer] Information is for End User's use only and may not be sold, redistributed or otherwise used for commercial purposes   All illustrations and images included in CareNotes® are the copyrighted property of A D A M , Inc  or Cory Quigley  The above information is an  only  It is not intended as medical advice for individual conditions or treatments  Talk to your doctor, nurse or pharmacist before following any medical regimen to see if it is safe and effective for you

## 2020-09-24 ENCOUNTER — TELEPHONE (OUTPATIENT)
Dept: PHYSICAL THERAPY | Facility: REHABILITATION | Age: 50
End: 2020-09-24

## 2020-09-24 NOTE — TELEPHONE ENCOUNTER
Patient called office on 9/22/20 with the following questions:     Has some questions re: her HEP     In conjunction with her Graylon Seat, you added:  lying on back, with resistance band, knees to side exhale  Back, inhale while raise each knee up to chest - exhale on way down  Bridges/butt squeeze    Has been performing BUT wants to know what is the benefit to merging these movements with kegels? Does it strengthen other muscles? Is it to help her make a mind connection? Feels she needs the reason behind to better understand why she is doing the exercises  Also - are these exercises more effective merged with her kegels vs  just sitting in chair doing kegels  and why? I returned her call at 3:55pm on 9/24/20  She was not home so I left a VM explaining that she should do her exercises with an emphasis on a pelvic floor contraction (kegel) first and then add the addition of the legs/hips, etc to the movement  This is done to regain neuromuscular control of the lumbopelvic region  I asked her to call back if she has any further questions

## 2020-09-30 ENCOUNTER — OFFICE VISIT (OUTPATIENT)
Dept: INTERNAL MEDICINE CLINIC | Facility: CLINIC | Age: 50
End: 2020-09-30
Payer: COMMERCIAL

## 2020-09-30 VITALS
HEIGHT: 67 IN | DIASTOLIC BLOOD PRESSURE: 91 MMHG | HEART RATE: 78 BPM | SYSTOLIC BLOOD PRESSURE: 130 MMHG | TEMPERATURE: 97.8 F | BODY MASS INDEX: 32.65 KG/M2 | WEIGHT: 208 LBS

## 2020-09-30 DIAGNOSIS — Z00.01 ENCOUNTER FOR GENERAL ADULT MEDICAL EXAMINATION WITH ABNORMAL FINDINGS: Primary | ICD-10-CM

## 2020-09-30 DIAGNOSIS — E03.9 ACQUIRED HYPOTHYROIDISM: ICD-10-CM

## 2020-09-30 PROCEDURE — 99396 PREV VISIT EST AGE 40-64: CPT | Performed by: INTERNAL MEDICINE

## 2020-09-30 PROCEDURE — 1036F TOBACCO NON-USER: CPT | Performed by: INTERNAL MEDICINE

## 2020-10-03 LAB
ALBUMIN SERPL-MCNC: 4.3 G/DL (ref 3.6–5.1)
ALBUMIN/GLOB SERPL: 1.9 (CALC) (ref 1–2.5)
ALP SERPL-CCNC: 45 U/L (ref 31–125)
ALT SERPL-CCNC: 16 U/L (ref 6–29)
AST SERPL-CCNC: 15 U/L (ref 10–35)
BASOPHILS # BLD AUTO: 60 CELLS/UL (ref 0–200)
BASOPHILS NFR BLD AUTO: 0.7 %
BILIRUB SERPL-MCNC: 0.5 MG/DL (ref 0.2–1.2)
BUN SERPL-MCNC: 10 MG/DL (ref 7–25)
BUN/CREAT SERPL: NORMAL (CALC) (ref 6–22)
CALCIUM SERPL-MCNC: 9.4 MG/DL (ref 8.6–10.2)
CHLORIDE SERPL-SCNC: 104 MMOL/L (ref 98–110)
CHOLEST SERPL-MCNC: 233 MG/DL
CHOLEST/HDLC SERPL: 4.3 (CALC)
CO2 SERPL-SCNC: 27 MMOL/L (ref 20–32)
CREAT SERPL-MCNC: 0.73 MG/DL (ref 0.5–1.1)
EOSINOPHIL # BLD AUTO: 179 CELLS/UL (ref 15–500)
EOSINOPHIL NFR BLD AUTO: 2.1 %
ERYTHROCYTE [DISTWIDTH] IN BLOOD BY AUTOMATED COUNT: 12.7 % (ref 11–15)
GLOBULIN SER CALC-MCNC: 2.3 G/DL (CALC) (ref 1.9–3.7)
GLUCOSE SERPL-MCNC: 88 MG/DL (ref 65–99)
HCT VFR BLD AUTO: 44.4 % (ref 35–45)
HDLC SERPL-MCNC: 54 MG/DL
HGB BLD-MCNC: 14.6 G/DL (ref 11.7–15.5)
LDLC SERPL CALC-MCNC: 152 MG/DL (CALC)
LYMPHOCYTES # BLD AUTO: 2100 CELLS/UL (ref 850–3900)
LYMPHOCYTES NFR BLD AUTO: 24.7 %
MCH RBC QN AUTO: 30.3 PG (ref 27–33)
MCHC RBC AUTO-ENTMCNC: 32.9 G/DL (ref 32–36)
MCV RBC AUTO: 92.1 FL (ref 80–100)
MONOCYTES # BLD AUTO: 544 CELLS/UL (ref 200–950)
MONOCYTES NFR BLD AUTO: 6.4 %
NEUTROPHILS # BLD AUTO: 5619 CELLS/UL (ref 1500–7800)
NEUTROPHILS NFR BLD AUTO: 66.1 %
NONHDLC SERPL-MCNC: 179 MG/DL (CALC)
PLATELET # BLD AUTO: 245 THOUSAND/UL (ref 140–400)
PMV BLD REES-ECKER: 10.5 FL (ref 7.5–12.5)
POTASSIUM SERPL-SCNC: 4.2 MMOL/L (ref 3.5–5.3)
PROT SERPL-MCNC: 6.6 G/DL (ref 6.1–8.1)
RBC # BLD AUTO: 4.82 MILLION/UL (ref 3.8–5.1)
SL AMB EGFR AFRICAN AMERICAN: 114 ML/MIN/1.73M2
SL AMB EGFR NON AFRICAN AMERICAN: 98 ML/MIN/1.73M2
SODIUM SERPL-SCNC: 140 MMOL/L (ref 135–146)
TRIGL SERPL-MCNC: 142 MG/DL
TSH SERPL-ACNC: 3.95 MIU/L
WBC # BLD AUTO: 8.5 THOUSAND/UL (ref 3.8–10.8)

## 2020-10-20 ENCOUNTER — ANNUAL EXAM (OUTPATIENT)
Dept: OBGYN CLINIC | Facility: MEDICAL CENTER | Age: 50
End: 2020-10-20
Payer: COMMERCIAL

## 2020-10-20 VITALS
DIASTOLIC BLOOD PRESSURE: 86 MMHG | HEIGHT: 67 IN | BODY MASS INDEX: 33.54 KG/M2 | SYSTOLIC BLOOD PRESSURE: 130 MMHG | WEIGHT: 213.7 LBS | TEMPERATURE: 97.6 F

## 2020-10-20 DIAGNOSIS — Z01.419 ENCOUNTER FOR WELL WOMAN EXAM WITH ROUTINE GYNECOLOGICAL EXAM: Primary | ICD-10-CM

## 2020-10-20 DIAGNOSIS — Z12.31 ENCOUNTER FOR SCREENING MAMMOGRAM FOR BREAST CANCER: ICD-10-CM

## 2020-10-20 PROBLEM — IMO0002 HUMAN PAPILLOMA VIRUS (HPV) DNA TEST POSITIVE: Status: ACTIVE | Noted: 2020-10-20

## 2020-10-20 PROCEDURE — G0145 SCR C/V CYTO,THINLAYER,RESCR: HCPCS | Performed by: STUDENT IN AN ORGANIZED HEALTH CARE EDUCATION/TRAINING PROGRAM

## 2020-10-20 PROCEDURE — S0610 ANNUAL GYNECOLOGICAL EXAMINA: HCPCS | Performed by: STUDENT IN AN ORGANIZED HEALTH CARE EDUCATION/TRAINING PROGRAM

## 2020-10-20 PROCEDURE — 87624 HPV HI-RISK TYP POOLED RSLT: CPT | Performed by: STUDENT IN AN ORGANIZED HEALTH CARE EDUCATION/TRAINING PROGRAM

## 2020-10-20 RX ORDER — PROPRANOLOL/HYDROCHLOROTHIAZID 40 MG-25MG
1 TABLET ORAL
COMMUNITY

## 2020-10-29 LAB
LAB AP GYN PRIMARY INTERPRETATION: NORMAL
Lab: NORMAL

## 2021-01-13 ENCOUNTER — TELEPHONE (OUTPATIENT)
Dept: OBGYN CLINIC | Facility: MEDICAL CENTER | Age: 51
End: 2021-01-13

## 2021-01-18 ENCOUNTER — TELEMEDICINE (OUTPATIENT)
Dept: INTERNAL MEDICINE CLINIC | Facility: CLINIC | Age: 51
End: 2021-01-18
Payer: COMMERCIAL

## 2021-01-18 VITALS — BODY MASS INDEX: 31.23 KG/M2 | WEIGHT: 199 LBS | HEIGHT: 67 IN

## 2021-01-18 DIAGNOSIS — Z20.828 EXPOSURE TO SARS-ASSOCIATED CORONAVIRUS: Primary | ICD-10-CM

## 2021-01-18 PROCEDURE — 3725F SCREEN DEPRESSION PERFORMED: CPT | Performed by: INTERNAL MEDICINE

## 2021-01-18 PROCEDURE — 3008F BODY MASS INDEX DOCD: CPT | Performed by: INTERNAL MEDICINE

## 2021-01-18 PROCEDURE — 99213 OFFICE O/P EST LOW 20 MIN: CPT | Performed by: INTERNAL MEDICINE

## 2021-01-18 PROCEDURE — 1036F TOBACCO NON-USER: CPT | Performed by: INTERNAL MEDICINE

## 2021-01-19 DIAGNOSIS — Z20.828 EXPOSURE TO SARS-ASSOCIATED CORONAVIRUS: ICD-10-CM

## 2021-01-19 PROCEDURE — U0005 INFEC AGEN DETEC AMPLI PROBE: HCPCS | Performed by: INTERNAL MEDICINE

## 2021-01-19 PROCEDURE — U0003 INFECTIOUS AGENT DETECTION BY NUCLEIC ACID (DNA OR RNA); SEVERE ACUTE RESPIRATORY SYNDROME CORONAVIRUS 2 (SARS-COV-2) (CORONAVIRUS DISEASE [COVID-19]), AMPLIFIED PROBE TECHNIQUE, MAKING USE OF HIGH THROUGHPUT TECHNOLOGIES AS DESCRIBED BY CMS-2020-01-R: HCPCS | Performed by: INTERNAL MEDICINE

## 2021-01-19 NOTE — PROGRESS NOTES
COVID-19 Virtual Visit     Assessment/Plan:    Problem List Items Addressed This Visit     None      Visit Diagnoses     Exposure to SARS-associated coronavirus    -  Primary    Relevant Orders    Novel Coronavirus (Covid-19),PCR [facility] - Collected at [facility] or [facility]         Disposition:     I referred patient to one of our centralized sites for a COVID-19 swab  I have spent 15 minutes directly with the patient  Greater than 50% of this time was spent in counseling/coordination of care regarding: instructions for management, importance of treatment compliance, risk factor reductions and impressions  Encounter provider Juan Kang MD    Provider located at [address]  [facility] [id number] [phone]    Recent Visits  No visits were found meeting these conditions  Showing recent visits within past 7 days and meeting all other requirements     Today's Visits  Date Type Provider Dept   01/18/21 Telemedicine Juan Kang MD [facility]  [facility]   Showing today's visits and meeting all other requirements     Future Appointments  No visits were found meeting these conditions  Showing future appointments within next 150 days and meeting all other requirements        Patient agrees to participate in a virtual check in via telephone or video visit instead of presenting to the office to address urgent/immediate medical needs  Patient is aware this is a billable service  After connecting through Telephone, the patient was identified by name and date of birth  Juliette Arenas was informed that this was a telemedicine visit and that the exam was being conducted confidentially over secure lines  Juliette Arenas acknowledged consent and understanding of privacy and security of the telemedicine visit   I informed the patient that I have reviewed her record in Epic and presented the opportunity for her to ask any questions regarding the visit today  The patient agreed to participate  It was my intent to perform this visit via video technology but the patient was not able to do a video connection so the visit was completed via audio telephone only  Subjective:   Branden Conte is a 52 y o  female who is concerned about COVID-19  Patient's symptoms include sore throat and headache  Patient denies fever, chills, fatigue, shortness of breath, abdominal pain and diarrhea       Exposure:   Contact with a person who is under investigation (PUI) for or who is positive for COVID-19 within the last 14 days?: No    Hospitalized recently for fever and/or lower respiratory symptoms?: No      Currently a healthcare worker that is involved in direct patient care?: No      Works in a special setting where the risk of COVID-19 transmission may be high? (this may include long-term care, correctional and long term facilities; homeless shelters; assisted-living facilities and group homes ): No      Resident in a special setting where the risk of COVID-19 transmission may be high? (this may include long-term care, correctional and long term facilities; homeless shelters; assisted-living facilities and group homes ): No      No results found for: Modesto Urena, [address] [place] [address]  Past Medical History:   Diagnosis Date    BV (bacterial vaginosis)     Eczema     Migraine     TIA (transient ischemic attack)      Past Surgical History:   Procedure Laterality Date    OTHER SURGICAL HISTORY      coil inserted into left fallopian tube    SALPINGECTOMY Right     TONSILLECTOMY      WISDOM TOOTH EXTRACTION       Current Outpatient Medications   Medication Sig Dispense Refill    Glucosamine HCl (GLUCOSAMINE PO) Take by mouth      MAGNESIUM PO Take by mouth      multivitamin (THERAGRAN) TABS Take 1 tablet by mouth daily      nitrofurantoin (MACROBID) 100 mg capsule Take one tablet po every 12 hours with food (Patient not taking: Reported on 9/17/2020) 14 capsule 0    Omega-3 Fatty Acids (FISH OIL PO) Take by mouth      SYNTHROID 50 MCG tablet       Turmeric 500 MG CAPS Take 1 capsule by mouth       No current facility-administered medications for this visit  No Known Allergies    Review of Systems   Constitutional: Negative for appetite change, chills, fatigue and fever  HENT: Positive for sore throat  Negative for trouble swallowing  Eyes: Negative for redness  Respiratory: Negative for shortness of breath  Cardiovascular: Negative for chest pain and palpitations  Gastrointestinal: Negative for abdominal pain, constipation and diarrhea  Genitourinary: Negative for dysuria and hematuria  Musculoskeletal: Negative for back pain and neck pain  Skin: Negative for rash  Neurological: Positive for headaches  Negative for seizures and weakness  Hematological: Negative for adenopathy  Psychiatric/Behavioral: Negative for confusion  The patient is not nervous/anxious  Objective:    Vitals:    01/18/21 1520   Weight: 90 3 kg (199 lb)   Height: 5' 7" (1 702 m)       Physical Exam  Constitutional:       Appearance: Normal appearance  Neurological:      General: No focal deficit present  Mental Status: She is alert and oriented to person, place, and time  Mental status is at baseline  Psychiatric:         Mood and Affect: Mood normal          Behavior: Behavior normal        VIRTUAL VISIT DISCLAIMER    Jannet Aleyda acknowledges that she has consented to an online visit or consultation  She understands that the online visit is based solely on information provided by her, and that, in the absence of a face-to-face physical evaluation by the physician, the diagnosis she receives is both limited and provisional in terms of accuracy and completeness  This is not intended to replace a full medical face-to-face evaluation by the physician   Jannet Maynard understands and accepts these terms

## 2021-01-20 LAB — SARS-COV-2 RNA RESP QL NAA+PROBE: NEGATIVE

## 2021-04-29 DIAGNOSIS — E03.9 ACQUIRED HYPOTHYROIDISM: Primary | ICD-10-CM

## 2021-04-30 RX ORDER — LEVOTHYROXINE SODIUM 0.05 MG/1
TABLET ORAL
Qty: 90 TABLET | Refills: 0 | Status: SHIPPED | OUTPATIENT
Start: 2021-04-30 | End: 2021-08-20

## 2021-05-13 ENCOUNTER — TELEPHONE (OUTPATIENT)
Dept: OBGYN CLINIC | Facility: MEDICAL CENTER | Age: 51
End: 2021-05-13

## 2021-05-13 NOTE — TELEPHONE ENCOUNTER
----- Message from John Miller MA sent at 4/2/2021 10:42 AM EDT -----  Regarding: Billing issue  The pt called back with the insurance on the phone  Requesting for this bill to be resubmitted as a prevented code only  She would like the B97 7 code removed and only bill code "Encounter for well woman exam with routine gynecological exam [[id number]]"  In order for the bill to be covered 100%  Please be aware the insurance company was on the phone on a recorded line   thanks

## 2021-05-18 ENCOUNTER — TRANSCRIBE ORDERS (OUTPATIENT)
Dept: ADMINISTRATIVE | Facility: HOSPITAL | Age: 51
End: 2021-05-18

## 2021-05-18 DIAGNOSIS — Z87.891 PERSONAL HISTORY OF NICOTINE DEPENDENCE: Primary | ICD-10-CM

## 2021-05-18 DIAGNOSIS — M54.89 OTHER DORSALGIA: ICD-10-CM

## 2021-05-18 DIAGNOSIS — R09.1 PLEURISY: ICD-10-CM

## 2021-05-18 DIAGNOSIS — R91.8 OTHER NONSPECIFIC ABNORMAL FINDING OF LUNG FIELD: ICD-10-CM

## 2021-05-21 ENCOUNTER — OFFICE VISIT (OUTPATIENT)
Dept: INTERNAL MEDICINE CLINIC | Facility: CLINIC | Age: 51
End: 2021-05-21
Payer: COMMERCIAL

## 2021-05-21 VITALS
OXYGEN SATURATION: 96 % | WEIGHT: 197 LBS | HEIGHT: 67 IN | BODY MASS INDEX: 30.92 KG/M2 | HEART RATE: 82 BPM | SYSTOLIC BLOOD PRESSURE: 128 MMHG | TEMPERATURE: 97.5 F | DIASTOLIC BLOOD PRESSURE: 84 MMHG

## 2021-05-21 DIAGNOSIS — F17.210 SMOKING GREATER THAN 30 PACK YEARS: ICD-10-CM

## 2021-05-21 DIAGNOSIS — R91.8 LUNG MASS: Primary | ICD-10-CM

## 2021-05-21 DIAGNOSIS — R91.1 LUNG NODULE, SOLITARY: ICD-10-CM

## 2021-05-21 DIAGNOSIS — E03.9 ACQUIRED HYPOTHYROIDISM: ICD-10-CM

## 2021-05-21 DIAGNOSIS — R09.1 PLEURISY: ICD-10-CM

## 2021-05-21 DIAGNOSIS — Z80.9 FAMILY HISTORY OF CANCER: ICD-10-CM

## 2021-05-21 PROCEDURE — 3008F BODY MASS INDEX DOCD: CPT | Performed by: INTERNAL MEDICINE

## 2021-05-21 PROCEDURE — 99214 OFFICE O/P EST MOD 30 MIN: CPT | Performed by: INTERNAL MEDICINE

## 2021-05-21 PROCEDURE — 1036F TOBACCO NON-USER: CPT | Performed by: INTERNAL MEDICINE

## 2021-05-21 RX ORDER — METHOCARBAMOL 500 MG/1
TABLET, FILM COATED ORAL
COMMUNITY
Start: 2021-05-14

## 2021-05-21 NOTE — PROGRESS NOTES
Assessment/Plan: This is a 81-year-old lady with a history of left-sided chest pain  She was seen in the urgent care center and a chest x-ray was done  She is worried about a lung nodule and has an extensive history of heavy smoking  She also has a family history of cancer  1  Lung mass  Comments:  Because of history of heavy smoking family history of cancer and abnormal chest x-ray a CT scan of the chest was ordered  2  Lung nodule, solitary  Comments:  Because of history of heavy smoking family history of cancer and abnormal chest x-ray a CT scan of the chest was ordered  Orders:  -     CT chest wo contrast; Future; Expected date: 05/21/2021    3  Pleurisy    4  Family history of cancer  Comments: This is a concern and she will be closely monitored  5  Acquired hypothyroidism  Comments:  Continue levothyroxine at the current dose  6  Smoking greater than 30 pack years  Comments:  She is off cigarettes now but has an extensive history of cigarette smoking  There are no diagnoses linked to this encounter  Subjective:      Patient ID: Gordy Tapia is a 52 y o  female  This is a 81-year-old lady with a history of left-sided chest pain  She was seen in the urgent care center and a chest x-ray was done  She is worried about a lung nodule and has an extensive history of heavy smoking  She also has a family history of cancer  The following portions of the patient's history were reviewed and updated as appropriate: She  has a past medical history of BV (bacterial vaginosis), Disease of thyroid gland, Eczema, Migraine, and TIA (transient ischemic attack)    She   Patient Active Problem List    Diagnosis Date Noted    Smoking greater than 30 pack years 05/21/2021    Acquired hypothyroidism 05/21/2021    Human papilloma virus (HPV) DNA test positive 10/20/2020    Urinary frequency 07/23/2019    Leukorrhea 07/23/2019    Encounter for screening mammogram for breast cancer 07/02/2019   Mary Carreno for gyn exam (general) (routine) w abnormal findings 07/02/2019    ADRIANA (stress urinary incontinence, female) 07/02/2019    BMI 31 0-31 9,adult 07/02/2019    BV (bacterial vaginosis) 04/23/2019    Routine screening for STI (sexually transmitted infection) 04/23/2019     She  has a past surgical history that includes Tonsillectomy; Salpingectomy (Right); Other surgical history; [place] tooth extraction (10/2013); Tonsillectomy and adenoidectomy; and Back surgery (2010)  Her family history includes Alzheimer's disease in her maternal grandmother; Breast cancer in her family and mother; Diabetes in her family; Fibroids in her sister; Hypertension in her family; Lung cancer in her family and father; Melanoma in her mother; Multiple myeloma in her mother; Psychiatric Illness in her family; Thyroid disease in her family  She  reports that she quit smoking about 2 years ago  She has never used smokeless tobacco  She reports current alcohol use  She reports that she does not use drugs  Current Outpatient Medications   Medication Sig Dispense Refill    Glucosamine HCl (GLUCOSAMINE PO) Take by mouth      levothyroxine 50 mcg tablet TAKE 1 TABLET 30 MINUTES BEFORE BREAKFAST 90 tablet 0    MAGNESIUM PO Take by mouth      multivitamin (THERAGRAN) TABS Take 1 tablet by mouth daily      Omega-3 Fatty Acids (FISH OIL PO) Take by mouth      Turmeric 500 MG CAPS Take 1 capsule by mouth      methocarbamol (ROBAXIN) 500 mg tablet        No current facility-administered medications for this visit        Current Outpatient Medications on File Prior to Visit   Medication Sig    Glucosamine HCl (GLUCOSAMINE PO) Take by mouth    levothyroxine 50 mcg tablet TAKE 1 TABLET 30 MINUTES BEFORE BREAKFAST    MAGNESIUM PO Take by mouth    multivitamin (THERAGRAN) TABS Take 1 tablet by mouth daily    Omega-3 Fatty Acids (FISH OIL PO) Take by mouth    Turmeric 500 MG CAPS Take 1 capsule by mouth    methocarbamol (ROBAXIN) 500 mg tablet      No current facility-administered medications on file prior to visit  She has No Known Allergies       Review of Systems   Constitutional: Negative for appetite change, chills, fatigue and fever  HENT: Negative for sore throat and trouble swallowing  Eyes: Negative for redness  Respiratory: Negative for shortness of breath  Cardiovascular: Negative for chest pain and palpitations  Gastrointestinal: Negative for abdominal pain, constipation and diarrhea  Genitourinary: Negative for dysuria and hematuria  Musculoskeletal: Positive for back pain  Negative for neck pain  Skin: Negative for rash  Neurological: Negative for seizures, weakness and headaches  Hematological: Negative for adenopathy  Psychiatric/Behavioral: Negative for confusion  The patient is not nervous/anxious  Objective:      /84 (BP Location: Right arm, Patient Position: Sitting, Cuff Size: Standard)   Pulse 82   Temp 97 5 °F (36 4 °C) (Temporal)   Ht 5' 7" (1 702 m)   Wt 89 4 kg (197 lb)   SpO2 96%   BMI 30 85 kg/m²     No results found for this or any previous visit (from the past 1344 hour(s))  Physical Exam  Constitutional:       General: She is not in acute distress  Appearance: Normal appearance  HENT:      Head: Normocephalic and atraumatic  Nose: Nose normal       Mouth/Throat:      Mouth: Mucous membranes are moist    Eyes:      Extraocular Movements: Extraocular movements intact  Pupils: Pupils are equal, round, and reactive to light  Cardiovascular:      Rate and Rhythm: Normal rate and regular rhythm  Pulses: Normal pulses  Heart sounds: Normal heart sounds  No murmur  No friction rub  Pulmonary:      Effort: Pulmonary effort is normal  No respiratory distress  Breath sounds: Normal breath sounds  No wheezing  Abdominal:      General: Abdomen is flat  Bowel sounds are normal  There is no distension  Palpations: Abdomen is soft  There is no mass  Tenderness: There is no abdominal tenderness  There is no guarding  Musculoskeletal: Normal range of motion  Neurological:      General: No focal deficit present  Mental Status: She is alert and oriented to person, place, and time  Mental status is at baseline  Cranial Nerves: No cranial nerve deficit  Psychiatric:         Mood and Affect: Mood normal          Behavior: Behavior normal        BMI Counseling: Body mass index is 30 85 kg/m²  The BMI is above normal  Nutrition recommendations include reducing portion sizes, decreasing overall calorie intake and 3-5 servings of fruits/vegetables daily

## 2021-05-24 ENCOUNTER — TELEPHONE (OUTPATIENT)
Dept: INTERNAL MEDICINE CLINIC | Facility: CLINIC | Age: 51
End: 2021-05-24

## 2021-05-24 NOTE — TELEPHONE ENCOUNTER
Patient had appointment on 5/21 as a follow up from [facility] on 5/14  She called central scheduling to make an appointment for her CT  [facility] was not concerned about the nodules in her lungs  They were more concerned of the heavy smoking, so they used a different code  Central scheduling told her the CT scan needs to be corrected to:    CT chest low dose without contrast    Diagnosis code: R 91 8 (other non specific finding of the lung field)    She is feeling worse today than she was on Friday and wants to have the CT done sooner than later  Once corrected, she will call central scheduling to change her appointment

## 2021-05-28 ENCOUNTER — HOSPITAL ENCOUNTER (OUTPATIENT)
Dept: RADIOLOGY | Facility: HOSPITAL | Age: 51
Discharge: HOME | End: 2021-05-28
Attending: INTERNAL MEDICINE
Payer: COMMERCIAL

## 2021-05-28 DIAGNOSIS — R91.1 LUNG NODULE, SOLITARY: ICD-10-CM

## 2021-05-28 PROCEDURE — G1004 CDSM NDSC: HCPCS

## 2021-05-28 PROCEDURE — 71250 CT THORAX DX C-: CPT

## 2021-07-13 ENCOUNTER — IMMUNIZATIONS (OUTPATIENT)
Dept: FAMILY MEDICINE CLINIC | Facility: HOSPITAL | Age: 51
End: 2021-07-13

## 2021-07-13 DIAGNOSIS — Z23 ENCOUNTER FOR IMMUNIZATION: Primary | ICD-10-CM

## 2021-07-13 PROCEDURE — 91300 SARS-COV-2 / COVID-19 MRNA VACCINE (PFIZER-BIONTECH) 30 MCG: CPT

## 2021-07-13 PROCEDURE — 0001A SARS-COV-2 / COVID-19 MRNA VACCINE (PFIZER-BIONTECH) 30 MCG: CPT

## 2021-08-03 ENCOUNTER — IMMUNIZATIONS (OUTPATIENT)
Dept: FAMILY MEDICINE CLINIC | Facility: HOSPITAL | Age: 51
End: 2021-08-03

## 2021-08-03 DIAGNOSIS — Z23 ENCOUNTER FOR IMMUNIZATION: Primary | ICD-10-CM

## 2021-08-03 PROCEDURE — 0002A SARS-COV-2 / COVID-19 MRNA VACCINE (PFIZER-BIONTECH) 30 MCG: CPT

## 2021-08-03 PROCEDURE — 91300 SARS-COV-2 / COVID-19 MRNA VACCINE (PFIZER-BIONTECH) 30 MCG: CPT

## 2021-08-20 DIAGNOSIS — E03.9 ACQUIRED HYPOTHYROIDISM: ICD-10-CM

## 2021-08-20 RX ORDER — LEVOTHYROXINE SODIUM 0.05 MG/1
TABLET ORAL
Qty: 90 TABLET | Refills: 0 | Status: SHIPPED | OUTPATIENT
Start: 2021-08-20 | End: 2021-11-18

## 2021-09-29 ENCOUNTER — OFFICE VISIT (OUTPATIENT)
Dept: INTERNAL MEDICINE CLINIC | Facility: CLINIC | Age: 51
End: 2021-09-29
Payer: COMMERCIAL

## 2021-09-29 VITALS
WEIGHT: 206 LBS | DIASTOLIC BLOOD PRESSURE: 78 MMHG | HEART RATE: 80 BPM | OXYGEN SATURATION: 98 % | SYSTOLIC BLOOD PRESSURE: 118 MMHG | HEIGHT: 67 IN | TEMPERATURE: 98.6 F | BODY MASS INDEX: 32.33 KG/M2

## 2021-09-29 DIAGNOSIS — R91.1 LUNG NODULE: ICD-10-CM

## 2021-09-29 DIAGNOSIS — F17.210 SMOKING GREATER THAN 30 PACK YEARS: ICD-10-CM

## 2021-09-29 DIAGNOSIS — Z00.00 ANNUAL PHYSICAL EXAM: Primary | ICD-10-CM

## 2021-09-29 PROCEDURE — 99396 PREV VISIT EST AGE 40-64: CPT | Performed by: INTERNAL MEDICINE

## 2021-09-29 NOTE — PATIENT INSTRUCTIONS

## 2021-09-29 NOTE — PROGRESS NOTES
[facility] INTERNAL MEDICINE    NAME: James Mathews  AGE: 52 y o  SEX: female  : 1970     DATE: 2021     Assessment and Plan:     Problem List Items Addressed This Visit        Other    Smoking greater than 30 pack years    Relevant Orders    CT chest wo contrast      Other Visit Diagnoses     Annual physical exam    -  Primary    Relevant Orders    CBC and differential    Comprehensive metabolic panel    Lipid panel    UA (URINE) with reflex to Scope    TSH, 3rd generation    Lung nodule        Relevant Orders    CT chest wo contrast          Immunizations and preventive care screenings were discussed with patient today  Appropriate education was printed on patient's after visit summary  Counseling:  · Alcohol/drug use: discussed moderation in alcohol intake, the recommendations for healthy alcohol use, and avoidance of illicit drug use  Depression Screening and Follow-up Plan:   Patient was screened for depression during today's encounter  They screened negative with a PHQ-2 score of 0  Return in about 1 year (around 2022)  Chief Complaint:     Chief Complaint   Patient presents with    Physical Exam      History of Present Illness:     Adult Annual Physical   Patient here for a comprehensive physical exam  The patient reports no problems  Diet and Physical Activity  · Diet/Nutrition: well balanced diet and consuming 3-5 servings of fruits/vegetables daily  · Exercise: no formal exercise  Depression Screening  PHQ-9 Depression Screening    PHQ-9:   Frequency of the following problems over the past two weeks:      Little interest or pleasure in doing things: 0 - not at all  Feeling down, depressed, or hopeless: 0 - not at all  PHQ-2 Score: 0       General Health  · Sleep: sleeps well and gets 7-8 hours of sleep on average     · Hearing: normal - bilateral   · Vision: most recent eye exam >1 year ago and wears contacts  · Dental: no dental visits for >1 year and brushes teeth twice daily  /GYN Health  · Patient is: premenopausal  · Last menstrual period: sept  · Contraceptive method: sterilized  Review of Systems:     Review of Systems   Constitutional: Negative for appetite change, chills, fatigue and fever  HENT: Negative for sore throat and trouble swallowing  Eyes: Negative for redness  Respiratory: Negative for shortness of breath  Cardiovascular: Negative for chest pain and palpitations  Gastrointestinal: Negative for abdominal pain, constipation and diarrhea  Genitourinary: Negative for dysuria and hematuria  Musculoskeletal: Negative for back pain and neck pain  Skin: Negative for rash  Neurological: Negative for seizures, weakness and headaches  Hematological: Negative for adenopathy  Psychiatric/Behavioral: Negative for confusion  The patient is not nervous/anxious         Past Medical History:     Past Medical History:   Diagnosis Date    BV (bacterial vaginosis)     Disease of thyroid gland     Eczema     Migraine     TIA (transient ischemic attack)       Past Surgical History:     Past Surgical History:   Procedure Laterality Date    BACK SURGERY      lower back - nerve cauterized     OTHER SURGICAL HISTORY      coil inserted into left fallopian tube    SALPINGECTOMY Right     TONSILLECTOMY      TONSILLECTOMY AND ADENOIDECTOMY      WISDOM TOOTH EXTRACTION  10/2013      Social History:     Social History     Socioeconomic History    Marital status: Single     Spouse name: None    Number of children: None    Years of education: None    Highest education level: None   Occupational History    Occupation: Customer service    Tobacco Use    Smoking status: Former Smoker     Quit date: 2019     Years since quittin 7    Smokeless tobacco: Never Used   Substance and Sexual Activity    Alcohol use: Yes     Comment: Social    Drug use: Never    Sexual activity: Yes     Partners: Male     Birth control/protection: Female Sterilization   Other Topics Concern    None   Social History Narrative    No alcohol use     Yarsani: Spiritual     - As per [employer]      Social Determinants of Health     Financial Resource Strain:     Difficulty of Paying Living Expenses:    Food Insecurity:     Worried About Running Out of Food in the Last Year:     [address] in the Last Year:    Transportation Needs:     Lack of Transportation (Medical):      Lack of Transportation (Non-Medical):    Physical Activity:     Days of Exercise per Week:     Minutes of Exercise per Session:    Stress:     Feeling of Stress :    Social Connections:     Frequency of Communication with Friends and Family:     Frequency of Social Gatherings with Friends and Family:     Attends Mandaen Services:     Active Member of Clubs or Organizations:     Attends Club or Organization Meetings:     Marital Status:    Intimate Partner Violence:     Fear of Current or Ex-Partner:     Emotionally Abused:     Physically Abused:     Sexually Abused:       Family History:     Family History   Problem Relation Age of Onset    Breast cancer Mother     Melanoma Mother     Multiple myeloma Mother     Lung cancer Father     Fibroids Sister     Alzheimer's disease Maternal Grandmother     Diabetes Family     Thyroid disease Family     Hypertension Family     Psychiatric Illness Family     Breast cancer Family     Lung cancer Family       Current Medications:     Current Outpatient Medications   Medication Sig Dispense Refill    Glucosamine HCl (GLUCOSAMINE PO) Take by mouth      levothyroxine 50 mcg tablet TAKE 1 TABLET 30 MINUTES BEFORE BREAKFAST 90 tablet 0    MAGNESIUM PO Take by mouth      multivitamin (THERAGRAN) TABS Take 1 tablet by mouth daily      Omega-3 Fatty Acids (FISH OIL PO) Take by mouth      Turmeric 500 MG CAPS Take 1 capsule by mouth  methocarbamol (ROBAXIN) 500 mg tablet        No current facility-administered medications for this visit  Allergies:     No Known Allergies   Physical Exam:     /78 (BP Location: Right arm, Patient Position: Sitting, Cuff Size: Standard)   Pulse 80   Temp 98 6 °F (37 °C) (Temporal)   Ht 5' 7" (1 702 m)   Wt 93 4 kg (206 lb)   SpO2 98%   BMI 32 26 kg/m²     Physical Exam  Vitals and nursing note reviewed  Constitutional:       General: She is not in acute distress  Appearance: She is well-developed  HENT:      Head: Normocephalic and atraumatic  Right Ear: Tympanic membrane normal       Left Ear: Tympanic membrane normal       Nose: Nose normal       Mouth/Throat:      Mouth: Mucous membranes are moist    Eyes:      Extraocular Movements: Extraocular movements intact  Conjunctiva/sclera: Conjunctivae normal       Pupils: Pupils are equal, round, and reactive to light  Cardiovascular:      Rate and Rhythm: Normal rate and regular rhythm  Pulses: Normal pulses  Heart sounds: No murmur heard  Pulmonary:      Effort: Pulmonary effort is normal  No respiratory distress  Breath sounds: Normal breath sounds  Abdominal:      Palpations: Abdomen is soft  Tenderness: There is no abdominal tenderness  Musculoskeletal:         General: Normal range of motion  Cervical back: Normal range of motion and neck supple  Skin:     General: Skin is warm and dry  Neurological:      General: No focal deficit present  Mental Status: She is alert and oriented to person, place, and time  BMI Counseling: Body mass index is 32 26 kg/m²  The BMI is above normal  Nutrition recommendations include reducing portion sizes, decreasing overall calorie intake and 3-5 servings of fruits/vegetables daily      Snehal Prince MD  [facility] INTERNAL MEDICINE

## 2021-11-18 DIAGNOSIS — E03.9 ACQUIRED HYPOTHYROIDISM: ICD-10-CM

## 2021-11-18 RX ORDER — LEVOTHYROXINE SODIUM 0.05 MG/1
TABLET ORAL
Qty: 90 TABLET | Refills: 1 | Status: SHIPPED | OUTPATIENT
Start: 2021-11-18

## 2022-08-23 DIAGNOSIS — E03.9 ACQUIRED HYPOTHYROIDISM: ICD-10-CM

## 2022-08-23 RX ORDER — LEVOTHYROXINE SODIUM 0.05 MG/1
50 TABLET ORAL
Qty: 90 TABLET | Refills: 0 | Status: SHIPPED | OUTPATIENT
Start: 2022-08-23

## 2022-09-04 LAB
ALBUMIN SERPL-MCNC: 4.2 G/DL (ref 3.8–4.8)
ALBUMIN/GLOB SERPL: 2 {RATIO} (ref 1.2–2.2)
ALP SERPL-CCNC: 49 IU/L (ref 44–121)
ALT SERPL-CCNC: 33 IU/L (ref 0–32)
AST SERPL-CCNC: 23 IU/L (ref 0–40)
BASOPHILS # BLD AUTO: 0.1 X10E3/UL (ref 0–0.2)
BASOPHILS NFR BLD AUTO: 1 %
BILIRUB SERPL-MCNC: 0.4 MG/DL (ref 0–1.2)
BUN SERPL-MCNC: 15 MG/DL (ref 6–24)
BUN/CREAT SERPL: 19 (ref 9–23)
CALCIUM SERPL-MCNC: 9.1 MG/DL (ref 8.7–10.2)
CHLORIDE SERPL-SCNC: 100 MMOL/L (ref 96–106)
CHOLEST SERPL-MCNC: 227 MG/DL (ref 100–199)
CO2 SERPL-SCNC: 24 MMOL/L (ref 20–29)
CREAT SERPL-MCNC: 0.78 MG/DL (ref 0.57–1)
EGFR: 93 ML/MIN/1.73
EOSINOPHIL # BLD AUTO: 0.3 X10E3/UL (ref 0–0.4)
EOSINOPHIL NFR BLD AUTO: 4 %
ERYTHROCYTE [DISTWIDTH] IN BLOOD BY AUTOMATED COUNT: 12.6 % (ref 11.7–15.4)
GLOBULIN SER-MCNC: 2.1 G/DL (ref 1.5–4.5)
GLUCOSE SERPL-MCNC: 90 MG/DL (ref 65–99)
HCT VFR BLD AUTO: 43 % (ref 34–46.6)
HDLC SERPL-MCNC: 57 MG/DL
HGB BLD-MCNC: 14.4 G/DL (ref 11.1–15.9)
IMM GRANULOCYTES # BLD: 0 X10E3/UL (ref 0–0.1)
IMM GRANULOCYTES NFR BLD: 0 %
LDLC SERPL CALC-MCNC: 134 MG/DL (ref 0–99)
LYMPHOCYTES # BLD AUTO: 2.4 X10E3/UL (ref 0.7–3.1)
LYMPHOCYTES NFR BLD AUTO: 32 %
MCH RBC QN AUTO: 30.3 PG (ref 26.6–33)
MCHC RBC AUTO-ENTMCNC: 33.5 G/DL (ref 31.5–35.7)
MCV RBC AUTO: 90 FL (ref 79–97)
MONOCYTES # BLD AUTO: 0.5 X10E3/UL (ref 0.1–0.9)
MONOCYTES NFR BLD AUTO: 6 %
NEUTROPHILS # BLD AUTO: 4.3 X10E3/UL (ref 1.4–7)
NEUTROPHILS NFR BLD AUTO: 57 %
PLATELET # BLD AUTO: 229 X10E3/UL (ref 150–450)
POTASSIUM SERPL-SCNC: 4.4 MMOL/L (ref 3.5–5.2)
PROT SERPL-MCNC: 6.3 G/DL (ref 6–8.5)
RBC # BLD AUTO: 4.76 X10E6/UL (ref 3.77–5.28)
SL AMB VLDL CHOLESTEROL CALC: 36 MG/DL (ref 5–40)
SODIUM SERPL-SCNC: 139 MMOL/L (ref 134–144)
TRIGL SERPL-MCNC: 203 MG/DL (ref 0–149)
TSH SERPL DL<=0.005 MIU/L-ACNC: 4.4 UIU/ML (ref 0.45–4.5)
WBC # BLD AUTO: 7.6 X10E3/UL (ref 3.4–10.8)

## 2022-09-07 ENCOUNTER — TELEPHONE (OUTPATIENT)
Dept: INTERNAL MEDICINE CLINIC | Facility: CLINIC | Age: 52
End: 2022-09-07

## 2022-09-07 NOTE — TELEPHONE ENCOUNTER
patient called and left a message that she wanted to go over lab results with dr Angie Stephens , called back and left her a voice message to call back the office

## 2022-09-12 NOTE — TELEPHONE ENCOUNTER
Pt asking for results of her labs  Does she need to come in or can we tell her over phone  If over phone she asked that we leave message on her machine because she is at work and may not be able to answer her cell phone

## 2022-09-15 ENCOUNTER — TELEPHONE (OUTPATIENT)
Dept: INTERNAL MEDICINE CLINIC | Facility: CLINIC | Age: 52
End: 2022-09-15

## 2022-09-15 NOTE — TELEPHONE ENCOUNTER
----- Message from Mabel Plasencia MD sent at 9/14/2022  7:45 PM EDT -----  Please make appointment after September 29th for yearly visit    ty  ----- Message -----  From: [facility] Lab Results In  Sent: 9/6/2022   8:58 AM EDT  To: Mabel Plasencia MD

## 2022-11-02 ENCOUNTER — OFFICE VISIT (OUTPATIENT)
Dept: INTERNAL MEDICINE CLINIC | Facility: CLINIC | Age: 52
End: 2022-11-02

## 2022-11-02 VITALS
WEIGHT: 204 LBS | SYSTOLIC BLOOD PRESSURE: 116 MMHG | DIASTOLIC BLOOD PRESSURE: 74 MMHG | HEIGHT: 67 IN | HEART RATE: 77 BPM | BODY MASS INDEX: 32.02 KG/M2 | OXYGEN SATURATION: 98 % | TEMPERATURE: 98.2 F

## 2022-11-02 DIAGNOSIS — E66.09 CLASS 1 OBESITY DUE TO EXCESS CALORIES WITHOUT SERIOUS COMORBIDITY WITH BODY MASS INDEX (BMI) OF 31.0 TO 31.9 IN ADULT: ICD-10-CM

## 2022-11-02 DIAGNOSIS — Z12.11 COLON CANCER SCREENING: ICD-10-CM

## 2022-11-02 DIAGNOSIS — Z23 NEED FOR TDAP VACCINATION: ICD-10-CM

## 2022-11-02 DIAGNOSIS — E55.9 VITAMIN D DEFICIENCY: ICD-10-CM

## 2022-11-02 DIAGNOSIS — Z00.00 ANNUAL PHYSICAL EXAM: Primary | ICD-10-CM

## 2022-11-02 DIAGNOSIS — F17.210 SMOKING GREATER THAN 30 PACK YEARS: ICD-10-CM

## 2022-11-02 DIAGNOSIS — Z12.31 VISIT FOR SCREENING MAMMOGRAM: ICD-10-CM

## 2022-11-02 DIAGNOSIS — R91.1 LUNG NODULE: ICD-10-CM

## 2022-11-02 NOTE — PATIENT INSTRUCTIONS

## 2022-11-02 NOTE — PROGRESS NOTES
ADULT ANNUAL [facility] INTERNAL MEDICINE    NAME: Jaimie Cornell  AGE: 54 y o  SEX: female  : 1970     DATE: 2022     Assessment and Plan:     Problem List Items Addressed This Visit        Other    Smoking greater than 30 pack years    Relevant Orders    CT lung nodule follow-up      Other Visit Diagnoses     Annual physical exam    -  Primary    Relevant Orders    CBC and differential    Comprehensive metabolic panel    Lipid panel    Urinalysis with microscopic    Vitamin D 25 hydroxy    Colon cancer screening        Relevant Orders    Cologuard    Lung nodule        CT scan was ordered  Relevant Orders    CT lung nodule follow-up    Vitamin D deficiency        Continue supplementation  Relevant Orders    Vitamin D 25 hydroxy    Visit for screening mammogram        Relevant Orders    MRI breast bilateral w and wo contrast w cad    Need for Tdap vaccination        Relevant Orders    TDAP VACCINE GREATER THAN OR EQUAL TO 8YO IM (Completed)    Class 1 obesity due to excess calories without serious comorbidity with body mass index (BMI) of 31 0 to 31 9 in adult        High-fiber low-calorie diet is advised  Immunizations and preventive care screenings were discussed with patient today  Appropriate education was printed on patient's after visit summary  Counseling:  Exercise: the importance of regular exercise/physical activity was discussed  Recommend exercise 3-5 times per week for at least 30 minutes  Return in about 6 months (around 2023)  Chief Complaint:     Chief Complaint   Patient presents with   • Physical Exam   • labs 22   • hm due      Mammogram- declines at this time and colonoscopy       History of Present Illness:     Adult Annual Physical   Patient here for a comprehensive physical exam  The patient reports no problems      Diet and Physical Activity  Diet/Nutrition: well balanced diet, limited junk food and consuming 3-5 servings of fruits/vegetables daily  Exercise: moderate cardiovascular exercise and 30-60 minutes on average  Depression Screening  PHQ-2/9 Depression Screening    Little interest or pleasure in doing things: 0 - not at all  Feeling down, depressed, or hopeless: 1 - several days  PHQ-2 Score: 1  PHQ-2 Interpretation: Negative depression screen       General Health  Sleep: sleeps well and gets 7-8 hours of sleep on average  Hearing: normal - bilateral   Vision: no vision problems, most recent eye exam >1 year ago, wears glasses and wears contacts  Dental: regular dental visits and brushes teeth twice daily  /GYN Health  Patient is: perimenopausal  Last menstrual period: 10/25  Contraceptive method: surgery  Review of Systems:     Review of Systems   Constitutional: Negative for appetite change, chills, fatigue and fever  HENT: Negative for sore throat and trouble swallowing  Eyes: Negative for redness  Respiratory: Negative for shortness of breath  Cardiovascular: Negative for chest pain and palpitations  Gastrointestinal: Negative for abdominal pain, constipation and diarrhea  Genitourinary: Negative for dysuria and hematuria  Musculoskeletal: Negative for back pain and neck pain  Skin: Negative for rash  Neurological: Negative for seizures, weakness and headaches  Hematological: Negative for adenopathy  Psychiatric/Behavioral: Negative for confusion  The patient is not nervous/anxious         Past Medical History:     Past Medical History:   Diagnosis Date   • BV (bacterial vaginosis)    • Disease of thyroid gland    • Eczema    • Migraine    • TIA (transient ischemic attack)       Past Surgical History:     Past Surgical History:   Procedure Laterality Date   • BACK SURGERY  2010    lower back - nerve cauterized    • OTHER SURGICAL HISTORY      coil inserted into left fallopian tube   • SALPINGECTOMY Right    • TONSILLECTOMY • TONSILLECTOMY AND ADENOIDECTOMY     • WISDOM TOOTH EXTRACTION  10/2013      Social History:     Social History     Socioeconomic History   • Marital status: Single     Spouse name: None   • Number of children: None   • Years of education: None   • Highest education level: None   Occupational History   • Occupation: Customer service    Tobacco Use   • Smoking status: Former Smoker     Quit date: 1/1/2019     Years since quitting: 3 8   • Smokeless tobacco: Never Used   Substance and Sexual Activity   • Alcohol use: Yes     Comment: Social   • Drug use: Never   • Sexual activity: Yes     Partners: Male     Birth control/protection: Female Sterilization   Other Topics Concern   • None   Social History Narrative    No alcohol use     Christianity: Spiritual     - As per [employer]      Social Determinants of Health     Financial Resource Strain: Not on file   Food Insecurity: Not on file   Transportation Needs: Not on file   Physical Activity: Not on file   Stress: Not on file   Social Connections: Not on file   Intimate Partner Violence: Not on file   Housing Stability: Not on file      Family History:     Family History   Problem Relation Age of Onset   • Breast cancer Mother    • Melanoma Mother    • Multiple myeloma Mother    • Lung cancer Father    • Fibroids Sister    • Alzheimer's disease Maternal Grandmother    • Diabetes Family    • Thyroid disease Family    • Hypertension Family    • Psychiatric Illness Family    • Breast cancer Family    • Lung cancer Family       Current Medications:     Current Outpatient Medications   Medication Sig Dispense Refill   • Glucosamine HCl (GLUCOSAMINE PO) Take by mouth     • levothyroxine 50 mcg tablet Take 1 tablet (50 mcg total) by mouth daily before breakfast Take 30 minutes before (Patient taking differently: Take 50 mcg by mouth daily before breakfast Take 30 minutes before, 100mcg only mondays) 90 tablet 0   • MAGNESIUM PO Take by mouth     • MILK THISTLE PO Take by mouth     • multivitamin (THERAGRAN) TABS Take 1 tablet by mouth daily     • NON FORMULARY Take 1 capsule by mouth     • Omega-3 Fatty Acids (FISH OIL PO) Take by mouth     • Turmeric 500 MG CAPS Take 1 capsule by mouth       No current facility-administered medications for this visit  Allergies:     No Known Allergies   Physical Exam:     /74 (BP Location: Right arm, Patient Position: Sitting, Cuff Size: Adult)   Pulse 77   Temp 98 2 °F (36 8 °C) (Temporal)   Ht 5' 7" (1 702 m)   Wt 92 5 kg (204 lb)   SpO2 98%   BMI 31 95 kg/m²     Physical Exam  Vitals and nursing note reviewed  Constitutional:       General: She is not in acute distress  Appearance: She is well-developed  She is obese  HENT:      Head: Normocephalic and atraumatic  Mouth/Throat:      Mouth: Mucous membranes are moist    Eyes:      Conjunctiva/sclera: Conjunctivae normal    Cardiovascular:      Rate and Rhythm: Normal rate and regular rhythm  Heart sounds: No murmur heard  Pulmonary:      Effort: Pulmonary effort is normal  No respiratory distress  Breath sounds: Normal breath sounds  Abdominal:      General: Abdomen is flat  Bowel sounds are normal       Palpations: Abdomen is soft  Tenderness: There is no abdominal tenderness  Musculoskeletal:         General: Normal range of motion  Cervical back: Normal range of motion and neck supple  Skin:     General: Skin is warm and dry  Neurological:      General: No focal deficit present  Mental Status: She is alert and oriented to person, place, and time  Mental status is at baseline  BMI Counseling: Body mass index is 31 95 kg/m²  The BMI is above normal  Nutrition recommendations include reducing portion sizes      Kathryn Lee MD  [facility] INTERNAL MEDICINE

## 2022-11-03 ENCOUNTER — TELEPHONE (OUTPATIENT)
Dept: INTERNAL MEDICINE CLINIC | Facility: CLINIC | Age: 52
End: 2022-11-03

## 2022-11-03 NOTE — TELEPHONE ENCOUNTER
Left message for patient to call back office  Dr Maggie Zambrano wanted her to know :     Please tell her that the CT scan was changed to CT lung nodule follow-up and it should not be CT lung cancer screening  She should get the blood work as per the script provided  Because she had problem with mammogram and will not get another 1 I discussed getting an MRI of the breast which was offered by [facility] at [place]  When patient calls back, we need to make sure her email is current to send the new script  Per Dr Maggie Zambrano:   Please tell her that Alan Eason is doing the MRI at the [address] [place] [address]     If she is inclined to do it then I would do it at the earliest opportunity because the special pricing might disappear at some point

## 2022-11-06 LAB
25(OH)D3+25(OH)D2 SERPL-MCNC: 32.8 NG/ML (ref 30–100)
ALBUMIN SERPL-MCNC: 4.5 G/DL (ref 3.8–4.8)
ALBUMIN/GLOB SERPL: 2.3 {RATIO} (ref 1.2–2.2)
ALP SERPL-CCNC: 51 IU/L (ref 44–121)
ALT SERPL-CCNC: 15 IU/L (ref 0–32)
APPEARANCE UR: ABNORMAL
AST SERPL-CCNC: 17 IU/L (ref 0–40)
BACTERIA URNS QL MICRO: ABNORMAL
BASOPHILS # BLD AUTO: 0.1 X10E3/UL (ref 0–0.2)
BASOPHILS NFR BLD AUTO: 1 %
BILIRUB SERPL-MCNC: 0.5 MG/DL (ref 0–1.2)
BILIRUB UR QL STRIP: NEGATIVE
BUN SERPL-MCNC: 10 MG/DL (ref 6–24)
BUN/CREAT SERPL: 13 (ref 9–23)
CALCIUM SERPL-MCNC: 9.1 MG/DL (ref 8.7–10.2)
CASTS URNS QL MICRO: ABNORMAL /LPF
CHLORIDE SERPL-SCNC: 105 MMOL/L (ref 96–106)
CHOLEST SERPL-MCNC: 229 MG/DL (ref 100–199)
CO2 SERPL-SCNC: 20 MMOL/L (ref 20–29)
COLOR UR: YELLOW
CREAT SERPL-MCNC: 0.8 MG/DL (ref 0.57–1)
EGFR: 90 ML/MIN/1.73
EOSINOPHIL # BLD AUTO: 0.2 X10E3/UL (ref 0–0.4)
EOSINOPHIL NFR BLD AUTO: 3 %
EPI CELLS #/AREA URNS HPF: ABNORMAL /HPF (ref 0–10)
ERYTHROCYTE [DISTWIDTH] IN BLOOD BY AUTOMATED COUNT: 12.6 % (ref 11.7–15.4)
GLOBULIN SER-MCNC: 2 G/DL (ref 1.5–4.5)
GLUCOSE SERPL-MCNC: 83 MG/DL (ref 70–99)
GLUCOSE UR QL: NEGATIVE
HCT VFR BLD AUTO: 41.6 % (ref 34–46.6)
HDLC SERPL-MCNC: 59 MG/DL
HGB BLD-MCNC: 14.4 G/DL (ref 11.1–15.9)
HGB UR QL STRIP: NEGATIVE
IMM GRANULOCYTES # BLD: 0 X10E3/UL (ref 0–0.1)
IMM GRANULOCYTES NFR BLD: 0 %
KETONES UR QL STRIP: NEGATIVE
LDLC SERPL CALC-MCNC: 157 MG/DL (ref 0–99)
LEUKOCYTE ESTERASE UR QL STRIP: NEGATIVE
LYMPHOCYTES # BLD AUTO: 1.9 X10E3/UL (ref 0.7–3.1)
LYMPHOCYTES NFR BLD AUTO: 28 %
MCH RBC QN AUTO: 30.8 PG (ref 26.6–33)
MCHC RBC AUTO-ENTMCNC: 34.6 G/DL (ref 31.5–35.7)
MCV RBC AUTO: 89 FL (ref 79–97)
MICRO URNS: ABNORMAL
MICRO URNS: ABNORMAL
MONOCYTES # BLD AUTO: 0.5 X10E3/UL (ref 0.1–0.9)
MONOCYTES NFR BLD AUTO: 7 %
NEUTROPHILS # BLD AUTO: 4.2 X10E3/UL (ref 1.4–7)
NEUTROPHILS NFR BLD AUTO: 61 %
NITRITE UR QL STRIP: NEGATIVE
PH UR STRIP: 5.5 [PH] (ref 5–7.5)
PLATELET # BLD AUTO: 246 X10E3/UL (ref 150–450)
POTASSIUM SERPL-SCNC: 4.4 MMOL/L (ref 3.5–5.2)
PROT SERPL-MCNC: 6.5 G/DL (ref 6–8.5)
PROT UR QL STRIP: NEGATIVE
RBC # BLD AUTO: 4.68 X10E6/UL (ref 3.77–5.28)
RBC #/AREA URNS HPF: ABNORMAL /HPF (ref 0–2)
SL AMB VLDL CHOLESTEROL CALC: 13 MG/DL (ref 5–40)
SODIUM SERPL-SCNC: 141 MMOL/L (ref 134–144)
SP GR UR: 1.02 (ref 1–1.03)
TRIGL SERPL-MCNC: 77 MG/DL (ref 0–149)
UROBILINOGEN UR STRIP-ACNC: 0.2 MG/DL (ref 0.2–1)
WBC # BLD AUTO: 6.9 X10E3/UL (ref 3.4–10.8)
WBC #/AREA URNS HPF: ABNORMAL /HPF (ref 0–5)
YEAST #/AREA URNS HPF: PRESENT /[HPF]

## 2023-01-17 LAB — COLOGUARD RESULT REPORTABLE: NEGATIVE

## 2023-02-21 ENCOUNTER — HOSPITAL ENCOUNTER (OUTPATIENT)
Dept: RADIOLOGY | Facility: HOSPITAL | Age: 53
Discharge: HOME | End: 2023-02-21
Attending: INTERNAL MEDICINE

## 2023-02-21 DIAGNOSIS — R91.1 LUNG NODULE: ICD-10-CM

## 2023-02-21 DIAGNOSIS — F17.210 SMOKING GREATER THAN 30 PACK YEARS: ICD-10-CM

## 2023-02-25 NOTE — PROGRESS NOTES
Assessment/Plan:  Return to office for annual exam    Discussed the natural course of menopause and associated vasomotor symptoms  Average age of menopause in [place] Barth[place]ome[place]  is 49 yo but varies based on genetic factors and tobacco use  Hot flushes are the most common symptoms experienced by women during the menopausal transition but other symptoms may occur including insomnia, vaginal dryness, and cognitive changes  Reviewed wide array of treatment options and their differing efficacies, including lifestyle management strategies (dressing in layers, keeping room temperature low, using a fan, avoiding triggers, weight loss, exercise), CBT, acupuncture, evening primrose oil, hypnosis, black cohosh, hormone replacement therapy, and/or SSRI/SNRI treatment  All questions answered and patient expressed understanding  Encouraged to tackle modifiable risk factors through  weight loss, exercise, improved nutrition and possibly cholesterol medication  Jenni Abad  will try lifestyle changes first    I have spent a total time of 35 minutes on 02/27/23 in caring for this patient including Risks and benefits of tx options, Instructions for management, Patient and family education, Risk factor reductions, Reviewing / ordering tests, medicine, procedures   and Obtaining or reviewing history    1  Hypercholesterolemia  -     Ambulatory Referral to Nutrition Services; Future    2  BMI 35 0-35 9,adult               Subjective:      Patient ID: Klaudia Merida is a 50 y o  female  HPI    Klaudia Merida is a 50 y o  [place] female who is here today for a problem visit   LMP 1/9/23  Typically monthly menses x 4-5 days with mod flow  Menses is tolerable  Here with c/o hot flashes x 6 months  They have lessened since last week  She is concerned as she recently read that there is an increase risk of stroke when having hot flashes and she has a hx of a TIA around age 23   She also has a history of migraine with aura while on a TOR[employer]  Jac Mcleod is sexually active with male partner of 4 years  Denies pain, bleeding or dryness  She is monogamous  She uses essure  for contraception (1 essure was removed)  Review of lipid panel shows hypercholesterolemia x 2 years  The following portions of the patient's history were reviewed and updated as appropriate: allergies, current medications, past family history, past medical history, past social history, past surgical history and problem list     Review of Systems   Constitutional: Negative  Intermittent hot flashes  Eyes: Negative for visual disturbance  Respiratory: Negative for chest tightness and shortness of breath  Cardiovascular: Negative for chest pain, palpitations and leg swelling  Gastrointestinal: Negative for abdominal pain, constipation, diarrhea, nausea and vomiting  Genitourinary: Negative for dysuria, menstrual problem and vaginal bleeding  Skin: Negative  Neurological: Negative for weakness, light-headedness and headaches  Psychiatric/Behavioral: Negative  All other systems reviewed and are negative  Objective:      /80 (BP Location: Left arm, Patient Position: Sitting, Cuff Size: Standard)   Ht 5' 5 5" (1 664 m)   Wt 97 2 kg (214 lb 3 2 oz)   LMP 01/09/2023 (Exact Date)   BMI 35 10 kg/m²          Physical Exam  Vitals and nursing note reviewed  Constitutional:       Appearance: Normal appearance  She is well-developed  Neurological:      Mental Status: She is alert and oriented to person, place, and time     Psychiatric:         Mood and Affect: Mood normal          Behavior: Behavior normal

## 2023-02-27 ENCOUNTER — OFFICE VISIT (OUTPATIENT)
Dept: OBGYN CLINIC | Facility: CLINIC | Age: 53
End: 2023-02-27

## 2023-02-27 VITALS
WEIGHT: 214.2 LBS | SYSTOLIC BLOOD PRESSURE: 130 MMHG | HEIGHT: 66 IN | BODY MASS INDEX: 34.42 KG/M2 | DIASTOLIC BLOOD PRESSURE: 80 MMHG

## 2023-02-27 DIAGNOSIS — E78.00 HYPERCHOLESTEROLEMIA: Primary | ICD-10-CM

## 2023-02-27 NOTE — PATIENT INSTRUCTIONS
Return to office for annual exam    Discussed the natural course of menopause and associated vasomotor symptoms  Average age of menopause in 79Jamari Bartholomew  is 49 yo but varies based on genetic factors and tobacco use  Hot flushes are the most common symptoms experienced by women during the menopausal transition but other symptoms may occur including insomnia, vaginal dryness, and cognitive changes  Reviewed wide array of treatment options and their differing efficacies, including lifestyle management strategies (dressing in layers, keeping room temperature low, using a fan, avoiding triggers, weight loss, exercise), CBT, acupuncture, evening primrose oil, hypnosis, black cohosh, hormone replacement therapy, and/or SSRI/SNRI treatment  All questions answered and patient expressed understanding  Encouraged to tackle modifiable risk factors through  weight loss, exercise, improved nutrition and possibly cholesterol medication

## 2023-03-01 ENCOUNTER — PATIENT MESSAGE (OUTPATIENT)
Dept: OBGYN CLINIC | Facility: CLINIC | Age: 53
End: 2023-03-01

## 2023-03-02 ENCOUNTER — TELEPHONE (OUTPATIENT)
Dept: INTERNAL MEDICINE CLINIC | Facility: CLINIC | Age: 53
End: 2023-03-02

## 2023-03-02 DIAGNOSIS — E78.00 HYPERCHOLESTEROLEMIA: ICD-10-CM

## 2023-03-02 NOTE — TELEPHONE ENCOUNTER
Regarding: FW: CAT Scan Result  Contact: [phone]  Please tell her the CAT scan report says stable and likely benign   ----- Message -----  From: Florence Brenner  Sent: 3/1/2023  12:53 PM EST  To: Yong Guerin MD  Subject: CAT Scan Result                                  ----- Message from Florence Brenner sent at 3/1/2023 12:53 PM EST -----       ----- Message from Lay Pereira to Yong Guerin MD sent at 3/1/2023 12:13 PM -----   Hi there, I had a CAT scan done on my lungs on February 21st, but I still don't see the results in My Chart  Did Dr June Wilson happen to receive that scan result yet?

## 2023-03-27 NOTE — PROGRESS NOTES
Assessment/Plan:  Calcium 6513-9417 mg (in divided doses-max 600 mg at one time) + 600-1000 IU Vit D daily  Exercise 150 minutes per week minimum including weight bearing exercises  Pap with high risk HPV Q 5 years, if normal   Collected today  Annual mammogram along with automated breast ultrasound ordered and monthly breast self exam recommended  Check insurance coverage prior to completing  Colonoscopy- Due 2026  Kegels 20 times twice daily  Silicone based lubricant with sex  (Use water based lubricant with condoms or sexual toys )    Vaginal moisturizers twice weekly as needed  If pelvic pains persists, call office and ultrasound will be ordered  Return to office in one year or sooner, if needed  1  Encntr for gyn exam (general) (routine) w/o abn findings    2  Encounter for Papanicolaou smear for cervical cancer screening  -     Liquid-based pap, screening    3  Cervical high risk HPV (human papillomavirus) test positive  -     Liquid-based pap, screening    4  Encounter for screening mammogram for breast cancer  -     Mammo screening bilateral w 3d & cad; Future  -     US breast screening bilateral complete (ABUS); Future    5  Family history of breast cancer in first degree relative  -     Mammo screening bilateral w 3d & cad; Future  -     US breast screening bilateral complete (ABUS); Future    6  BMI 34 0-34 9,adult               Subjective:      Patient ID: Cinthya Santillan is a 50 y o  female  HPI    Cinthya Santillan is a 50 y o  [place] female who is here today for her annual visit  No gynecologic health concerns  Hx of TIA at age 23  Last in office 2/27/23 to discuss menopausal symptoms  Menses in January and March lasting 4 days with mod flow  No bleeding since  Menses is acceptable  Exercise- Not currently as she is dealing with a herniated lumbar disc  Cinthya Santillan is sexually active with male partner of 4 years  Denies pain, bleeding or dryness    She "is monogamous  She uses essure  for contraception (1 essure was removed)  Admits to \"bethany mae\" in her left lower pelvis  Concerned it could be her essure  Intermittent x 6-12 months  Rates pain 2/10  Self limiting  She is not interested in STD screening today  She denies vaginal discharge, itching or pelvic pain  She has no  urinary concerns, does not have incontinence  No bowel concerns  No breast concerns  Last pap:   10/20/20 normal with + HR HPV 18  7/2/19 normal pap with + HR HPV 18  DEXA scan: N/A  Mammogram: N/A; refuses due to bad experience during a previous mammogram when she passed out  Mother had breast cancer at age 72  No BRCA testing  Colonoscopy: 1/9/23 normal cologard  The following portions of the patient's history were reviewed and updated as appropriate: allergies, current medications, past family history, past medical history, past social history, past surgical history and problem list     Review of Systems   Constitutional: Negative  Negative for activity change, appetite change, chills, diaphoresis, fatigue, fever and unexpected weight change  HENT: Negative for congestion, dental problem, sneezing, sore throat and trouble swallowing  Eyes: Negative for visual disturbance  Respiratory: Negative for chest tightness and shortness of breath  Cardiovascular: Negative for chest pain and leg swelling  Gastrointestinal: Negative for abdominal pain, constipation, diarrhea, nausea and vomiting  Genitourinary: Positive for pelvic pain  Negative for difficulty urinating, dyspareunia, dysuria, frequency, hematuria, menstrual problem, urgency, vaginal bleeding, vaginal discharge and vaginal pain  Musculoskeletal: Negative for back pain and neck pain  Skin: Negative  Allergic/Immunologic: Negative  Neurological: Negative for weakness and headaches  Hematological: Negative for adenopathy  Psychiatric/Behavioral: Negative            Objective:      BP " "120/78 (BP Location: Left arm, Patient Position: Sitting, Cuff Size: Large)   Ht 5' 6\" (1 676 m)   Wt 95 7 kg (211 lb)   BMI 34 06 kg/m²          Physical Exam  Vitals and nursing note reviewed  Constitutional:       Appearance: Normal appearance  She is well-developed  She is obese  HENT:      Head: Normocephalic and atraumatic  Eyes:      General:         Right eye: No discharge  Left eye: No discharge  Neck:      Thyroid: No thyromegaly  Trachea: Trachea normal    Cardiovascular:      Rate and Rhythm: Normal rate and regular rhythm  Heart sounds: Normal heart sounds  Pulmonary:      Effort: Pulmonary effort is normal       Breath sounds: Normal breath sounds  Chest:   Breasts:     Breasts are symmetrical       Right: Normal  No inverted nipple, mass, nipple discharge, skin change or tenderness  Left: Normal  No inverted nipple, mass, nipple discharge, skin change or tenderness  Abdominal:      Palpations: Abdomen is soft  Genitourinary:     General: Normal vulva  Exam position: Lithotomy position  Labia:         Right: No rash, tenderness, lesion or injury  Left: No rash, tenderness, lesion or injury  Urethra: No prolapse, urethral pain, urethral swelling or urethral lesion  Vagina: Normal  No signs of injury and foreign body  No vaginal discharge, erythema, tenderness or bleeding  Cervix: Normal       Uterus: Normal        Adnexa:         Right: No mass, tenderness or fullness  Left: No mass, tenderness or fullness  Rectum: No external hemorrhoid  Comments: Very guarded during exam  Musculoskeletal:         General: Normal range of motion  Cervical back: Normal range of motion and neck supple  Lymphadenopathy:      Head:      Right side of head: No submental, submandibular or tonsillar adenopathy  Left side of head: No submental, submandibular or tonsillar adenopathy        Cervical: No cervical " adenopathy  Upper Body:      Right upper body: No supraclavicular or axillary adenopathy  Left upper body: No supraclavicular or axillary adenopathy  Lower Body: No right inguinal adenopathy  No left inguinal adenopathy  Skin:     General: Skin is warm and dry  Neurological:      Mental Status: She is alert and oriented to person, place, and time     Psychiatric:         Mood and Affect: Mood normal          Behavior: Behavior normal

## 2023-03-28 ENCOUNTER — ANNUAL EXAM (OUTPATIENT)
Dept: OBGYN CLINIC | Facility: MEDICAL CENTER | Age: 53
End: 2023-03-28

## 2023-03-28 VITALS
DIASTOLIC BLOOD PRESSURE: 78 MMHG | HEIGHT: 66 IN | WEIGHT: 211 LBS | BODY MASS INDEX: 33.91 KG/M2 | SYSTOLIC BLOOD PRESSURE: 120 MMHG

## 2023-03-28 DIAGNOSIS — R87.810 CERVICAL HIGH RISK HPV (HUMAN PAPILLOMAVIRUS) TEST POSITIVE: ICD-10-CM

## 2023-03-28 DIAGNOSIS — Z80.3 FAMILY HISTORY OF BREAST CANCER IN FIRST DEGREE RELATIVE: ICD-10-CM

## 2023-03-28 DIAGNOSIS — Z12.4 ENCOUNTER FOR PAPANICOLAOU SMEAR FOR CERVICAL CANCER SCREENING: ICD-10-CM

## 2023-03-28 DIAGNOSIS — Z01.419 ENCNTR FOR GYN EXAM (GENERAL) (ROUTINE) W/O ABN FINDINGS: Primary | ICD-10-CM

## 2023-03-28 DIAGNOSIS — Z12.31 ENCOUNTER FOR SCREENING MAMMOGRAM FOR BREAST CANCER: ICD-10-CM

## 2023-03-28 NOTE — PATIENT INSTRUCTIONS
Calcium 9172-2489 mg (in divided doses-max 600 mg at one time) + 600-1000 IU Vit D daily  Exercise 150 minutes per week minimum including weight bearing exercises  Pap with high risk HPV Q 5 years, if normal   Collected today  Annual mammogram along with automated breast ultrasound ordered and monthly breast self exam recommended  Check insurance coverage prior to completing  Colonoscopy- Due 2026  Kegels 20 times twice daily  Silicone based lubricant with sex  (Use water based lubricant with condoms or sexual toys )    Vaginal moisturizers twice weekly as needed  If pelvic pains persists, call office and ultrasound will be ordered  Return to office in one year or sooner, if needed

## 2023-03-28 NOTE — PROGRESS NOTES
Patient presents for a routine annual visit  LMP - 3/13/23  Last Pap Smear- 10/20/2020 -/+HPV18  Mammogram- Not on file  Colonoscopy- 2023 normal cologuard    Former smoker  Moderate drinker  Currently sexually active  Mom breast cancer  No concerns/questions for today's visit

## 2023-03-30 ENCOUNTER — TELEPHONE (OUTPATIENT)
Dept: INTERNAL MEDICINE CLINIC | Facility: CLINIC | Age: 53
End: 2023-03-30

## 2023-03-30 NOTE — TELEPHONE ENCOUNTER
She heard that she should have 1200mg of calcium a day for her age so she wanted to know based on her bloodwork from last time should she take this amount  Also she heard that in order for calcium to absorb correctly it has to be taken with magnesium so she wants to know what your thoughts are on this and how much of that she should take

## 2023-03-31 LAB
HPV HR 12 DNA CVX QL NAA+PROBE: NEGATIVE
HPV16 DNA CVX QL NAA+PROBE: NEGATIVE
HPV18 DNA CVX QL NAA+PROBE: NEGATIVE

## 2023-04-04 LAB
LAB AP GYN PRIMARY INTERPRETATION: NORMAL
Lab: NORMAL

## 2023-05-03 ENCOUNTER — OFFICE VISIT (OUTPATIENT)
Dept: INTERNAL MEDICINE CLINIC | Facility: CLINIC | Age: 53
End: 2023-05-03

## 2023-05-03 VITALS
BODY MASS INDEX: 33.11 KG/M2 | DIASTOLIC BLOOD PRESSURE: 68 MMHG | HEART RATE: 70 BPM | SYSTOLIC BLOOD PRESSURE: 120 MMHG | TEMPERATURE: 98.6 F | OXYGEN SATURATION: 99 % | HEIGHT: 66 IN | WEIGHT: 206 LBS

## 2023-05-03 DIAGNOSIS — Z12.31 VISIT FOR SCREENING MAMMOGRAM: ICD-10-CM

## 2023-05-03 DIAGNOSIS — F17.210 SMOKING GREATER THAN 30 PACK YEARS: ICD-10-CM

## 2023-05-03 DIAGNOSIS — R10.2 PELVIC PAIN: ICD-10-CM

## 2023-05-03 DIAGNOSIS — E03.9 ACQUIRED HYPOTHYROIDISM: Primary | ICD-10-CM

## 2023-05-03 RX ORDER — LEVOTHYROXINE SODIUM 0.05 MG/1
50 TABLET ORAL
Qty: 103 TABLET | Refills: 1 | Status: SHIPPED | OUTPATIENT
Start: 2023-05-03 | End: 2023-08-01

## 2023-05-03 NOTE — PROGRESS NOTES
Assessment/Plan:           1  Acquired hypothyroidism  Comments:  Continue levothyroxine 50 mcg  Orders:  -     levothyroxine 50 mcg tablet; Take 1 tablet (50 mcg total) by mouth daily before breakfast Take 30 minutes before, 100mcg only mondays    2  Smoking greater than 30 pack years  Comments:  CT lung cancer screening recommended  3  Visit for screening mammogram  Comments:  Patient does not want to go for regular mammograms due to bad experience in the past   Orders:  -     MRI Fast Breast; Future; Expected date: 05/03/2023    4  Pelvic pain history of Essure  Comments: This was from a procedure done about 8 to 10 years ago  She will obtain ultrasound and follow-up with GYN  Orders:  -     US pelvis complete w transvaginal; Future; Expected date: 05/03/2023         1  Acquired hypothyroidism    - levothyroxine 50 mcg tablet; Take 1 tablet (50 mcg total) by mouth daily before breakfast Take 30 minutes before, 100mcg only mondays  Dispense: 103 tablet; Refill: 1    2  Smoking greater than 30 pack years      3  Visit for screening mammogram    - MRI Fast Breast; Future       No problem-specific Assessment & Plan notes found for this encounter  Subjective:      Patient ID: Ana Crawford is a 50 y o  female  HPI    The following portions of the patient's history were reviewed and updated as appropriate: She  has a past medical history of BV (bacterial vaginosis), Depression, Disease of thyroid gland, Eczema, Migraine, and TIA (transient ischemic attack)    She   Patient Active Problem List    Diagnosis Date Noted   • Pelvic pain history of Essure 05/08/2023   • Smoking greater than 30 pack years 05/21/2021   • Acquired hypothyroidism 05/21/2021   • Human papilloma virus (HPV) DNA test positive 10/20/2020   • Urinary frequency 07/23/2019   • Leukorrhea 07/23/2019   • Encounter for screening mammogram for breast cancer 07/02/2019   • Encntr for gyn exam (general) (routine) w abnormal findings 07/02/2019   • ADRIANA (stress urinary incontinence, female) 07/02/2019   • BMI 31 0-31 9,adult 07/02/2019   • BV (bacterial vaginosis) 04/23/2019   • Routine screening for STI (sexually transmitted infection) 04/23/2019     She  has a past surgical history that includes Tonsillectomy; Salpingectomy (Right); Other surgical history; [place] tooth extraction (10/2013); Tonsillectomy and adenoidectomy; and Back surgery (2010)  Her family history includes Alzheimer's disease in her maternal grandmother; Breast cancer in her family; Breast cancer (age of onset: 72) in her mother; Diabetes in her family; Fibroids in her sister; Hypertension in her family; Lung cancer in her family and father; Melanoma in her mother; Multiple myeloma in her mother; Psychiatric Illness in her family; Thyroid disease in her family  She  reports that she quit smoking about 4 years ago  Her smoking use included cigarettes  She has a 30 00 pack-year smoking history  She has never used smokeless tobacco  She reports current alcohol use of about 5 0 standard drinks per week  She reports that she does not use drugs  Current Outpatient Medications   Medication Sig Dispense Refill   • Calcium 600-10 MG-MCG CHEW Chew     • Glucosamine HCl (GLUCOSAMINE PO) Take by mouth     • levothyroxine 50 mcg tablet Take 1 tablet (50 mcg total) by mouth daily before breakfast Take 30 minutes before, 100mcg only mondays 103 tablet 1   • MAGNESIUM PO Take by mouth     • MILK THISTLE PO Take by mouth     • multivitamin (THERAGRAN) TABS Take 1 tablet by mouth daily     • NON FORMULARY Take 1 capsule by mouth     • Omega-3 Fatty Acids (FISH OIL PO) Take by mouth       No current facility-administered medications for this visit       Current Outpatient Medications on File Prior to Visit   Medication Sig   • Calcium 600-10 MG-MCG CHEW Chew   • Glucosamine HCl (GLUCOSAMINE PO) Take by mouth   • MAGNESIUM PO Take by mouth   • MILK THISTLE PO Take by mouth   • multivitamin "(THERAGRAN) TABS Take 1 tablet by mouth daily   • NON FORMULARY Take 1 capsule by mouth   • Omega-3 Fatty Acids (FISH OIL PO) Take by mouth     No current facility-administered medications on file prior to visit  She has No Known Allergies       Review of Systems   Constitutional: Negative for appetite change, chills, fatigue and fever  HENT: Negative for sore throat and trouble swallowing  Eyes: Negative for redness  Respiratory: Negative for shortness of breath  Cardiovascular: Negative for chest pain and palpitations  Gastrointestinal: Positive for abdominal pain  Negative for constipation and diarrhea  Genitourinary: Negative for dysuria and hematuria  Musculoskeletal: Negative for back pain and neck pain  Skin: Negative for rash  Neurological: Negative for seizures, weakness and headaches  Hematological: Negative for adenopathy  Psychiatric/Behavioral: Negative for confusion  The patient is not nervous/anxious            Objective:      /68 (BP Location: Left arm, Patient Position: Sitting, Cuff Size: Large)   Pulse 70   Temp 98 6 °F (37 °C) (Temporal)   Ht 5' 6\" (1 676 m)   Wt 93 4 kg (206 lb)   SpO2 99%   BMI 33 25 kg/m²     Results Reviewed     None          Recent Results (from the past 1344 hour(s))   Liquid-based pap, screening    Collection Time: 03/28/23  6:08 PM   Result Value Ref Range    Case Report       Gynecologic Cytology Report                       Case: [id number]                                  Authorizing Provider:  TITUS Sanchez     Collected:           03/28/2023 1808              Ordering Location:     [facility]      Received:            03/28/2023 1808                                     [facility]                                                                          First Screen:          AVERY Ricketts                        " Specimen:    LIQUID-BASED PAP, SCREENING, Cervix                                                        Primary Interpretation Negative for intraepithelial lesion or malignancy     Specimen Adequacy       Satisfactory for evaluation  Absence of endocervical/transformation zone component  Additional Information       [employer]'s FDA approved ,  and ThinPrep Imaging Duo System are utilized with strict adherence to the 's instruction manual to prepare gynecologic and non-gynecologic cytology specimens for the production of ThinPrep slides as well as for gynecologic ThinPrep imaging  These processes have been validated by our laboratory and/or by the   The Pap test is not a diagnostic procedure and should not be used as the sole means to detect cervical cancer  It is only a screening procedure to aid in the detection of cervical cancer and its precursors  Both false-negative and false-positive results have been experienced  Your patient's test result should be interpreted in this context together with the history and clinical findings  HPV High Risk    Collection Time: 03/28/23  6:08 PM    Specimen: Cervix; Genital   Result Value Ref Range    HPV Other HR Negative Negative    HPV16 Negative Negative    HPV18 Negative Negative        Physical Exam  Constitutional:       General: She is not in acute distress  Appearance: Normal appearance  HENT:      Head: Normocephalic and atraumatic  Nose: Nose normal       Mouth/Throat:      Mouth: Mucous membranes are moist    Eyes:      Extraocular Movements: Extraocular movements intact  Pupils: Pupils are equal, round, and reactive to light  Cardiovascular:      Rate and Rhythm: Normal rate and regular rhythm  Pulses: Normal pulses  Heart sounds: Normal heart sounds  No murmur heard  No friction rub  Pulmonary:      Effort: Pulmonary effort is normal  No respiratory distress  Breath sounds: Normal breath sounds  No wheezing  Abdominal:      General: Abdomen is flat  Bowel sounds are normal  There is no distension  Palpations: Abdomen is soft  There is no mass  Tenderness: There is abdominal tenderness  There is no guarding  Musculoskeletal:         General: Normal range of motion  Cervical back: Normal range of motion  Neurological:      General: No focal deficit present  Mental Status: She is alert and oriented to person, place, and time  Mental status is at baseline  Cranial Nerves: No cranial nerve deficit     Psychiatric:         Mood and Affect: Mood normal          Behavior: Behavior normal

## 2023-05-08 PROBLEM — R10.2 PELVIC PAIN: Status: ACTIVE | Noted: 2023-05-08

## 2023-09-04 NOTE — PROGRESS NOTES
Assessment/Plan:           1  Annual physical exam  -     CBC and differential; Future  -     Comprehensive metabolic panel; Future  -     Lipid panel; Future  -     Urinalysis with microscopic  -     Vitamin D 25 hydroxy; Future    2  Colon cancer screening  -     Cologuard    3  Smoking greater than 30 pack years  Comments:  She has quit smoking and she is being followed for lung nodule  Orders:  -     CT lung nodule follow-up; Future; Expected date: 11/02/2022    4  Lung nodule  Comments:  CT scan was ordered  Orders:  -     CT lung nodule follow-up; Future; Expected date: 11/02/2022    5  Vitamin D deficiency  Comments:  Continue supplementation  Orders:  -     Vitamin D 25 hydroxy; Future    6  Visit for screening mammogram  -     MRI breast bilateral w and wo contrast w cad; Future; Expected date: 11/02/2022    7  Need for Tdap vaccination  -     TDAP VACCINE GREATER THAN OR EQUAL TO 6YO IM    8  Class 1 obesity due to excess calories without serious comorbidity with body mass index (BMI) of 31 0 to 31 9 in adult  Comments:  High-fiber low-calorie diet is advised  1  Annual physical exam    - CBC and differential; Future  - Comprehensive metabolic panel; Future  - Lipid panel; Future  - Urinalysis with microscopic  - Vitamin D 25 hydroxy; Future    2  Colon cancer screening    - Cologuard    3  Smoking greater than 30 pack years    - CT lung screening program; Future  - CT lung nodule follow-up; Future    4  Lung nodule    - CT lung screening program; Future  - CT lung nodule follow-up; Future    5  Vitamin D deficiency    - Vitamin D 25 hydroxy; Future    6  Visit for screening mammogram    - MRI breast bilateral w and wo contrast w cad; Future    7  Need for Tdap vaccination    - TDAP VACCINE GREATER THAN OR EQUAL TO 6YO IM    8   Class 1 obesity due to excess calories without serious comorbidity with body mass index (BMI) of 31 0 to 31 9 in adult         No problem-specific Assessment & Plan notes found for this encounter  Subjective:      Patient ID: Rowena Rivera is a 54 y o  female  This is a 53-year-old lady with a history of hypothyroidism obesity smoking  Her mother had breast cancer and melanoma  She had a very bad experience with getting a mammogram and will not get another one  She was advised to explore the option of getting breast MRI instead  The following portions of the patient's history were reviewed and updated as appropriate: She  has a past medical history of BV (bacterial vaginosis), Disease of thyroid gland, Eczema, Migraine, and TIA (transient ischemic attack)  She   Patient Active Problem List    Diagnosis Date Noted   • Smoking greater than 30 pack years 05/21/2021   • Acquired hypothyroidism 05/21/2021   • Human papilloma virus (HPV) 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 positive 10/20/2020   • Urinary frequency 07/23/2019   • Leukorrhea 07/23/2019   • Encounter for screening mammogram for breast cancer 07/02/2019   • Encntr for gyn exam (general) (routine) w abnormal findings 07/02/2019   • ADRIANA (stress urinary incontinence, female) 07/02/2019   • BMI 31 0-31 9,adult 07/02/2019   • BV (bacterial vaginosis) 04/23/2019   • Routine screening for STI (sexually transmitted infection) 04/23/2019     She  has a past surgical history that includes Tonsillectomy; Salpingectomy (Right); Other surgical history; [place] tooth extraction (10/2013); Tonsillectomy and adenoidectomy; and Back surgery (2010)  Her family history includes Alzheimer's disease in her maternal grandmother; Breast cancer in her family and mother; Diabetes in her family; Fibroids in her sister; Hypertension in her family; Lung cancer in her family and father; Melanoma in her mother; Multiple myeloma in her mother; Psychiatric Illness in her family; Thyroid disease in her family  She  reports that she quit smoking about 3 years ago  She has never used smokeless tobacco  She reports current alcohol use   She reports that she does not use drugs  Current Outpatient Medications   Medication Sig Dispense Refill   • Glucosamine HCl (GLUCOSAMINE PO) Take by mouth     • levothyroxine 50 mcg tablet Take 1 tablet (50 mcg total) by mouth daily before breakfast Take 30 minutes before (Patient taking differently: Take 50 mcg by mouth daily before breakfast Take 30 minutes before, 100mcg only mondays) 90 tablet 0   • MAGNESIUM PO Take by mouth     • MILK THISTLE PO Take by mouth     • multivitamin (THERAGRAN) TABS Take 1 tablet by mouth daily     • NON FORMULARY Take 1 capsule by mouth     • Omega-3 Fatty Acids (FISH OIL PO) Take by mouth     • Turmeric 500 MG CAPS Take 1 capsule by mouth       No current facility-administered medications for this visit  Current Outpatient Medications on File Prior to Visit   Medication Sig   • Glucosamine HCl (GLUCOSAMINE PO) Take by mouth   • levothyroxine 50 mcg tablet Take 1 tablet (50 mcg total) by mouth daily before breakfast Take 30 minutes before (Patient taking differently: Take 50 mcg by mouth daily before breakfast Take 30 minutes before, 100mcg only mondays)   • MAGNESIUM PO Take by mouth   • MILK THISTLE PO Take by mouth   • multivitamin (THERAGRAN) TABS Take 1 tablet by mouth daily   • NON FORMULARY Take 1 capsule by mouth   • Omega-3 Fatty Acids (FISH OIL PO) Take by mouth   • Turmeric 500 MG CAPS Take 1 capsule by mouth   • [DISCONTINUED] methocarbamol (ROBAXIN) 500 mg tablet      No current facility-administered medications on file prior to visit  She has No Known Allergies       Review of Systems   Constitutional: Negative for appetite change, chills, fatigue and fever  HENT: Negative for sore throat and trouble swallowing  Eyes: Negative for redness  Respiratory: Negative for shortness of breath  Cardiovascular: Negative for chest pain and palpitations  Gastrointestinal: Negative for abdominal pain, constipation and diarrhea  Genitourinary: Negative for dysuria and hematuria  Musculoskeletal: Negative for back pain and neck pain  Skin: Negative for rash  Neurological: Negative for seizures, weakness and headaches  Hematological: Negative for adenopathy  Psychiatric/Behavioral: Negative for confusion  The patient is not nervous/anxious  Objective:      /74 (BP Location: Right arm, Patient Position: Sitting, Cuff Size: Adult)   Pulse 77   Temp 98 2 °F (36 8 °C) (Temporal)   Ht 5' 7" (1 702 m)   Wt 92 5 kg (204 lb)   SpO2 98%   BMI 31 95 kg/m²     Results Reviewed     None          No results found for this or any previous visit (from the past 1344 hour(s))  Physical Exam  Constitutional:       General: She is not in acute distress  Appearance: Normal appearance  She is obese  HENT:      Head: Normocephalic and atraumatic  Right Ear: Tympanic membrane normal       Left Ear: Tympanic membrane normal       Nose: Nose normal       Mouth/Throat:      Mouth: Mucous membranes are moist    Eyes:      Extraocular Movements: Extraocular movements intact  Pupils: Pupils are equal, round, and reactive to light  Cardiovascular:      Rate and Rhythm: Normal rate and regular rhythm  Pulses: Normal pulses  Heart sounds: Normal heart sounds  No murmur heard  No friction rub  Pulmonary:      Effort: Pulmonary effort is normal  No respiratory distress  Breath sounds: Normal breath sounds  No wheezing  Abdominal:      General: Abdomen is flat  Bowel sounds are normal  There is no distension  Palpations: Abdomen is soft  There is no mass  Tenderness: There is no abdominal tenderness  There is no guarding  Musculoskeletal:         General: Normal range of motion  Cervical back: Normal range of motion and neck supple  Skin:     General: Skin is warm  Neurological:      General: No focal deficit present  Mental Status: She is alert and oriented to person, place, and time  Mental status is at baseline        Cranial Nerves: No cranial nerve deficit  Psychiatric:         Mood and Affect: Mood normal          Behavior: Behavior normal        BMI Counseling: Body mass index is 31 95 kg/m²  The BMI is above normal  Nutrition recommendations include reducing portion sizes  Improved

## 2023-09-27 ENCOUNTER — OFFICE VISIT (OUTPATIENT)
Dept: INTERNAL MEDICINE CLINIC | Facility: CLINIC | Age: 53
End: 2023-09-27
Payer: COMMERCIAL

## 2023-09-27 VITALS
HEART RATE: 68 BPM | WEIGHT: 209.6 LBS | HEIGHT: 66 IN | DIASTOLIC BLOOD PRESSURE: 60 MMHG | TEMPERATURE: 97.3 F | BODY MASS INDEX: 33.68 KG/M2 | OXYGEN SATURATION: 96 % | SYSTOLIC BLOOD PRESSURE: 126 MMHG

## 2023-09-27 DIAGNOSIS — Z00.00 WELL ADULT EXAM: ICD-10-CM

## 2023-09-27 DIAGNOSIS — E03.9 ACQUIRED HYPOTHYROIDISM: Primary | ICD-10-CM

## 2023-09-27 DIAGNOSIS — E66.09 CLASS 1 OBESITY DUE TO EXCESS CALORIES WITHOUT SERIOUS COMORBIDITY WITH BODY MASS INDEX (BMI) OF 33.0 TO 33.9 IN ADULT: ICD-10-CM

## 2023-09-27 PROBLEM — E66.811 CLASS 1 OBESITY DUE TO EXCESS CALORIES WITHOUT SERIOUS COMORBIDITY WITH BODY MASS INDEX (BMI) OF 33.0 TO 33.9 IN ADULT: Status: ACTIVE | Noted: 2023-09-27

## 2023-09-27 PROCEDURE — 99396 PREV VISIT EST AGE 40-64: CPT | Performed by: INTERNAL MEDICINE

## 2023-09-27 RX ORDER — LEVOTHYROXINE SODIUM 0.05 MG/1
50 TABLET ORAL
Qty: 103 TABLET | Refills: 1 | Status: SHIPPED | OUTPATIENT
Start: 2023-09-27 | End: 2023-09-27

## 2023-09-27 RX ORDER — LEVOTHYROXINE SODIUM 0.05 MG/1
50 TABLET ORAL
Qty: 103 TABLET | Refills: 1 | Status: SHIPPED | OUTPATIENT
Start: 2023-09-27 | End: 2023-12-26

## 2023-09-27 NOTE — PROGRESS NOTES
Assessment/Plan:           1. Acquired hypothyroidism  Comments:  Continue levothyroxine 50 mcg's. Orders:  -     T4, free; Future  -     TSH, 3rd generation; Future  -     levothyroxine 50 mcg tablet; Take 1 tablet (50 mcg total) by mouth daily before breakfast Take 30 minutes before, 100mcg only mondays    2. Well adult exam  Comments:  Form was filled out. Orders:  -     CBC and differential; Future  -     Comprehensive metabolic panel; Future  -     Lipid panel; Future  -     Urinalysis with microscopic    3. Class 1 obesity due to excess calories without serious comorbidity with body mass index (BMI) of 33.0 to 33.9 in adult  Comments:  High-fiber low calorie diet advised. 1. Acquired hypothyroidism         No problem-specific Assessment & Plan notes found for this encounter. Subjective:      Patient ID: Allan Dunn is a 50 y.o. female. HPI    The following portions of the patient's history were reviewed and updated as appropriate: She  has a past medical history of BV (bacterial vaginosis), Depression, Disease of thyroid gland, Eczema, Migraine, and TIA (transient ischemic attack).   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 10/20/2020   • Urinary frequency 07/23/2019   • Leukorrhea 07/23/2019   • Encounter for screening mammogram for breast cancer 07/02/2019   • Encntr for gyn exam (general) (routine) w abnormal findings 07/02/2019   • ADRIANA (stress urinary incontinence, female) 07/02/2019   • BMI 31.0-31.9,adult 07/02/2019   • BV (bacterial vaginosis) 04/23/2019   • Routine screening for STI (sexually transmitted infection) 04/23/2019     She  has a past surgical history that includes Tonsillectomy; Salpingectomy (Right); Other surgical history; [place] tooth extraction (10/2013); Tonsillectomy and adenoidectomy; and Back surgery (2010). Her family history includes Alzheimer's disease in her maternal grandmother; Breast cancer in her family; Breast cancer (age of onset: 72) in her mother; Diabetes in her family; Fibroids in her sister; Hypertension in her family; Lung cancer in her family and father; Melanoma in her mother; Multiple myeloma in her mother; Psychiatric Illness in her family; Thyroid disease in her family. She  reports that she quit smoking about 4 years ago. Her smoking use included cigarettes. She has a 30.00 pack-year smoking history. She has never used smokeless tobacco. She reports current alcohol use of about 5.0 standard drinks of alcohol per week. She reports that she does not use drugs. Current Outpatient Medications   Medication Sig Dispense Refill   • Calcium 600-10 MG-MCG CHEW Chew     • Glucosamine HCl (GLUCOSAMINE PO) Take by mouth     • levothyroxine 50 mcg tablet Take 1 tablet (50 mcg total) by mouth daily before breakfast Take 30 minutes before, 100mcg only mondays 103 tablet 1   • MAGNESIUM PO Take by mouth     • MILK THISTLE PO Take by mouth if needed     • multivitamin (THERAGRAN) TABS Take 1 tablet by mouth daily     • NON FORMULARY Take 1 capsule by mouth     • Omega-3 Fatty Acids (FISH OIL PO) Take by mouth       No current facility-administered medications for this visit.      Current Outpatient Medications on File Prior to Visit   Medication Sig   • Calcium 600-10 MG-MCG CHEW Chew   • Glucosamine HCl (GLUCOSAMINE PO) Take by mouth   • MAGNESIUM PO Take by mouth   • MILK THISTLE PO Take by mouth if needed   • multivitamin (THERAGRAN) TABS Take 1 tablet by mouth daily   • NON FORMULARY Take 1 capsule by mouth   • Omega-3 Fatty Acids (FISH OIL PO) Take by mouth   • [DISCONTINUED] levothyroxine 50 mcg tablet Take 1 tablet (50 mcg total) by mouth daily before breakfast Take 30 minutes before, 100mcg only mondays     No current facility-administered medications on file prior to visit. Medications Discontinued During This Encounter   Medication Reason   • levothyroxine 50 mcg tablet Reorder   • levothyroxine 50 mcg tablet       She has No Known Allergies. .    Review of Systems   Constitutional: Negative for appetite change, chills, fatigue and fever. HENT: Negative for sore throat and trouble swallowing. Eyes: Negative for redness. Respiratory: Negative for shortness of breath. Cardiovascular: Negative for chest pain and palpitations. Gastrointestinal: Negative for abdominal pain, constipation and diarrhea. Genitourinary: Negative for dysuria and hematuria. Musculoskeletal: Negative for back pain and neck pain. Skin: Negative for rash. Neurological: Negative for seizures, weakness and headaches. Hematological: Negative for adenopathy. Psychiatric/Behavioral: Negative for confusion. The patient is not nervous/anxious. Objective:      /60 (BP Location: Left arm, Patient Position: Sitting, Cuff Size: Standard)   Pulse 68   Temp (!) 97.3 °F (36.3 °C) (Temporal)   Ht 5' 6" (1.676 m)   Wt 95.1 kg (209 lb 9.6 oz)   SpO2 96%   BMI 33.83 kg/m²     Results Reviewed     None          No results found for this or any previous visit (from the past 1344 hour(s)). Physical Exam  Constitutional:       General: She is not in acute distress. Appearance: Normal appearance. HENT:      Head: Normocephalic and atraumatic. Nose: Nose normal.      Mouth/Throat:      Mouth: Mucous membranes are moist.   Eyes:      Extraocular Movements: Extraocular movements intact. Pupils: Pupils are equal, round, and reactive to light. Cardiovascular:      Rate and Rhythm: Normal rate and regular rhythm. Pulses: Normal pulses. Heart sounds: Normal heart sounds. No murmur heard. No friction rub. Pulmonary:      Effort: Pulmonary effort is normal. No respiratory distress. Breath sounds: Normal breath sounds. No wheezing. Abdominal:      General: Abdomen is flat. Bowel sounds are normal. There is no distension. Palpations: Abdomen is soft. There is no mass. Tenderness: There is no abdominal tenderness. There is no guarding. Musculoskeletal:         General: Normal range of motion. Neurological:      General: No focal deficit present. Mental Status: She is alert and oriented to person, place, and time. Mental status is at baseline. Cranial Nerves: No cranial nerve deficit.    Psychiatric:         Mood and Affect: Mood normal.         Behavior: Behavior normal.

## 2023-11-24 ENCOUNTER — TELEPHONE (OUTPATIENT)
Dept: OBGYN CLINIC | Facility: CLINIC | Age: 53
End: 2023-11-24

## 2023-11-24 NOTE — TELEPHONE ENCOUNTER
Patient has been spotting but now more so bleeding - she hasn't had a regular cycle in a few months - last time she saw william they discuss that she may be in menopause - I couldn't find a sooner appt - she is scheduled for 1/23

## 2023-11-24 NOTE — TELEPHONE ENCOUNTER
Pt has had irreg cycles for 5 mos. Sometimes has just 3 days of spotting. This last menses is now in its 14th day - mostly spotting but today more like a normal period. Going on 15 days today. Wants to know if this is ok?  Thx  (Aware you are not here today)

## 2023-11-27 NOTE — TELEPHONE ENCOUNTER
She should do a UPT (one of her essures was removed, if I remember correctly) and complete her already ordered lab work that includes a thyroid study. If this returns normal, she could consider birth control to regulate her bleed.

## 2023-12-06 ENCOUNTER — HOSPITAL ENCOUNTER (OUTPATIENT)
Dept: RADIOLOGY | Facility: HOSPITAL | Age: 53
Discharge: HOME | End: 2023-12-06
Attending: INTERNAL MEDICINE
Payer: COMMERCIAL

## 2023-12-06 DIAGNOSIS — R10.2 PELVIC PAIN: ICD-10-CM

## 2023-12-06 PROCEDURE — 76830 TRANSVAGINAL US NON-OB: CPT

## 2023-12-06 PROCEDURE — 76856 US EXAM PELVIC COMPLETE: CPT

## 2023-12-09 LAB
ALBUMIN SERPL-MCNC: 4.1 G/DL (ref 3.8–4.9)
ALBUMIN/GLOB SERPL: 2.1 {RATIO} (ref 1.2–2.2)
ALP SERPL-CCNC: 50 IU/L (ref 44–121)
ALT SERPL-CCNC: 16 IU/L (ref 0–32)
APPEARANCE UR: CLEAR
AST SERPL-CCNC: 13 IU/L (ref 0–40)
BACTERIA URNS QL MICRO: ABNORMAL
BASOPHILS # BLD AUTO: 0.1 X10E3/UL (ref 0–0.2)
BASOPHILS NFR BLD AUTO: 1 %
BILIRUB SERPL-MCNC: 0.4 MG/DL (ref 0–1.2)
BILIRUB UR QL STRIP: NEGATIVE
BUN SERPL-MCNC: 14 MG/DL (ref 6–24)
BUN/CREAT SERPL: 23 (ref 9–23)
CALCIUM SERPL-MCNC: 8.7 MG/DL (ref 8.7–10.2)
CASTS URNS QL MICRO: ABNORMAL /LPF
CHLORIDE SERPL-SCNC: 104 MMOL/L (ref 96–106)
CHOLEST SERPL-MCNC: 207 MG/DL (ref 100–199)
CO2 SERPL-SCNC: 22 MMOL/L (ref 20–29)
COLOR UR: YELLOW
CREAT SERPL-MCNC: 0.62 MG/DL (ref 0.57–1)
EGFR: 108 ML/MIN/1.73
EOSINOPHIL # BLD AUTO: 0.3 X10E3/UL (ref 0–0.4)
EOSINOPHIL NFR BLD AUTO: 3 %
EPI CELLS #/AREA URNS HPF: ABNORMAL /HPF (ref 0–10)
ERYTHROCYTE [DISTWIDTH] IN BLOOD BY AUTOMATED COUNT: 12.4 % (ref 11.7–15.4)
GLOBULIN SER-MCNC: 2 G/DL (ref 1.5–4.5)
GLUCOSE SERPL-MCNC: 91 MG/DL (ref 70–99)
GLUCOSE UR QL: NEGATIVE
HCT VFR BLD AUTO: 43.3 % (ref 34–46.6)
HDLC SERPL-MCNC: 46 MG/DL
HGB BLD-MCNC: 14.3 G/DL (ref 11.1–15.9)
HGB UR QL STRIP: ABNORMAL
IMM GRANULOCYTES # BLD: 0 X10E3/UL (ref 0–0.1)
IMM GRANULOCYTES NFR BLD: 0 %
KETONES UR QL STRIP: NEGATIVE
LDLC SERPL CALC-MCNC: 135 MG/DL (ref 0–99)
LEUKOCYTE ESTERASE UR QL STRIP: NEGATIVE
LYMPHOCYTES # BLD AUTO: 2 X10E3/UL (ref 0.7–3.1)
LYMPHOCYTES NFR BLD AUTO: 25 %
MCH RBC QN AUTO: 30.3 PG (ref 26.6–33)
MCHC RBC AUTO-ENTMCNC: 33 G/DL (ref 31.5–35.7)
MCV RBC AUTO: 92 FL (ref 79–97)
MICRO URNS: ABNORMAL
MONOCYTES # BLD AUTO: 0.4 X10E3/UL (ref 0.1–0.9)
MONOCYTES NFR BLD AUTO: 5 %
NEUTROPHILS # BLD AUTO: 5.3 X10E3/UL (ref 1.4–7)
NEUTROPHILS NFR BLD AUTO: 66 %
NITRITE UR QL STRIP: NEGATIVE
PH UR STRIP: 5.5 [PH] (ref 5–7.5)
PLATELET # BLD AUTO: 219 X10E3/UL (ref 150–450)
POTASSIUM SERPL-SCNC: 4.4 MMOL/L (ref 3.5–5.2)
PROT SERPL-MCNC: 6.1 G/DL (ref 6–8.5)
PROT UR QL STRIP: NEGATIVE
RBC # BLD AUTO: 4.72 X10E6/UL (ref 3.77–5.28)
RBC #/AREA URNS HPF: ABNORMAL /HPF (ref 0–2)
SL AMB VLDL CHOLESTEROL CALC: 26 MG/DL (ref 5–40)
SODIUM SERPL-SCNC: 140 MMOL/L (ref 134–144)
SP GR UR: 1.02 (ref 1–1.03)
T4 SERPL-MCNC: 6.2 UG/DL (ref 4.5–12)
TRIGL SERPL-MCNC: 146 MG/DL (ref 0–149)
TSH SERPL DL<=0.005 MIU/L-ACNC: 3.68 UIU/ML (ref 0.45–4.5)
UROBILINOGEN UR STRIP-ACNC: 0.2 MG/DL (ref 0.2–1)
WBC # BLD AUTO: 8.2 X10E3/UL (ref 3.4–10.8)
WBC #/AREA URNS HPF: ABNORMAL /HPF (ref 0–5)

## 2023-12-12 ENCOUNTER — TELEPHONE (OUTPATIENT)
Dept: INTERNAL MEDICINE CLINIC | Facility: CLINIC | Age: 53
End: 2023-12-12

## 2023-12-12 NOTE — TELEPHONE ENCOUNTER
I spoke to patient. She was under the impression that essur devices were removed.   She will follow-up with a gynecologist.

## 2023-12-12 NOTE — TELEPHONE ENCOUNTER
YVES hoover called from [facility] radiology dept they found significant findings in patients pelvic ultrasound .  Call back number is

## 2023-12-18 ENCOUNTER — TELEPHONE (OUTPATIENT)
Dept: OBGYN CLINIC | Facility: CLINIC | Age: 53
End: 2023-12-18

## 2023-12-18 NOTE — TELEPHONE ENCOUNTER
----- Message from Mel Aguilar RN sent at 12/12/2023  3:30 PM EST -----  Regarding: FW: Pelvic Ultrasound Results   Contact: [phone]    ----- Message -----  From: Radha Sue  Sent: 12/12/2023   3:23 PM EST  To: [facility]  Subject: Pelvic Ultrasound Results                        Nathen Case,  I just read though the results and I do have a few questions. Would I be able to set up a time to speak with you for a few minutes?

## 2023-12-22 NOTE — TELEPHONE ENCOUNTER
Patient left voice message ob the script line was free if Bela could call her today to discuss US results

## 2024-01-23 ENCOUNTER — OFFICE VISIT (OUTPATIENT)
Dept: OBGYN CLINIC | Facility: MEDICAL CENTER | Age: 54
End: 2024-01-23
Payer: COMMERCIAL

## 2024-01-23 VITALS
HEIGHT: 66 IN | SYSTOLIC BLOOD PRESSURE: 122 MMHG | BODY MASS INDEX: 33.91 KG/M2 | WEIGHT: 211 LBS | DIASTOLIC BLOOD PRESSURE: 84 MMHG

## 2024-01-23 DIAGNOSIS — N92.6 IRREGULAR MENSES: Primary | ICD-10-CM

## 2024-01-23 PROCEDURE — 99213 OFFICE O/P EST LOW 20 MIN: CPT | Performed by: NURSE PRACTITIONER

## 2024-01-23 NOTE — PATIENT INSTRUCTIONS
She had a menses after that pelvic US.   Endometrial biopsy offered and currently declined.   Will hold on biopsy until after next menses. If normal bleed, will monitor. Any abnormal vaginal bleeding, low threshold for tolerance and recommend an endometrial biopsy.    Return to office for annual and as needed

## 2024-01-23 NOTE — PROGRESS NOTES
Assessment/Plan:  She had a menses after that pelvic US.   Endometrial biopsy offered and currently declined.   Will hold on biopsy until after next menses. If normal bleed, will monitor. Any abnormal vaginal bleeding, low threshold for tolerance and recommend an endometrial biopsy.    Return to office for annual and as needed.     1. Irregular menses               Subjective:      Patient ID: Radha Sue is a 53 y.o. female.    HPI    Radha Sue is a 53 y.o.  female who is here today for a problem visit .   Last in office on 3/28/23 for annual exam.   Here today with c/o irregular menses.   Spotting -23.   Normal menses 12/10-.   Light flow ton  only then no bleeding until today.     Prior to November,  menses was typically monthly but would occasionally skip a month. Bleed lasting  3-4 days with mod to heavy flow.     23 pelvic US ordered by PCP for pelvic pain-> endometrial complex of 15 mm.   Bilateral essure devices in place.This was a shock to her as she thought one was removed.    Simple bilateral ovarian cysts.     No pelvic pain today.     The following portions of the patient's history were reviewed and updated as appropriate: allergies, current medications, past family history, past medical history, past social history, past surgical history, and problem list.    Review of Systems   Constitutional: Negative.    Eyes:  Negative for visual disturbance.   Respiratory:  Negative for chest tightness.    Cardiovascular:  Negative for chest pain.   Gastrointestinal:  Negative for abdominal pain.   Genitourinary:  Positive for menstrual problem. Negative for dysuria, pelvic pain, vaginal bleeding and vaginal discharge.   Skin: Negative.    Neurological:  Negative for weakness, light-headedness and headaches.   Psychiatric/Behavioral: Negative.     All other systems reviewed and are negative.        Objective:      /84 (BP Location: Left arm, Patient Position:  "Sitting, Cuff Size: Large)   Ht 5' 6\" (1.676 m)   Wt 95.7 kg (211 lb)   LMP 01/23/2024 (Exact Date)   BMI 34.06 kg/m²          Physical Exam  Vitals and nursing note reviewed.   Constitutional:       Appearance: Normal appearance. She is well-developed.   Skin:     General: Skin is warm and dry.   Neurological:      Mental Status: She is alert and oriented to person, place, and time.   Psychiatric:         Mood and Affect: Mood normal.         Behavior: Behavior normal.       Exam otherwise deferred  "

## 2024-02-21 PROBLEM — Z11.3 ROUTINE SCREENING FOR STI (SEXUALLY TRANSMITTED INFECTION): Status: RESOLVED | Noted: 2019-04-23 | Resolved: 2024-02-21

## 2024-05-13 DIAGNOSIS — E03.9 ACQUIRED HYPOTHYROIDISM: ICD-10-CM

## 2024-05-14 RX ORDER — LEVOTHYROXINE SODIUM 0.05 MG/1
TABLET ORAL
Qty: 103 TABLET | Refills: 1 | Status: SHIPPED | OUTPATIENT
Start: 2024-05-14

## 2024-06-13 ENCOUNTER — OFFICE VISIT (OUTPATIENT)
Dept: INTERNAL MEDICINE CLINIC | Facility: CLINIC | Age: 54
End: 2024-06-13
Payer: COMMERCIAL

## 2024-06-13 VITALS
TEMPERATURE: 98 F | BODY MASS INDEX: 33.43 KG/M2 | DIASTOLIC BLOOD PRESSURE: 66 MMHG | HEART RATE: 74 BPM | OXYGEN SATURATION: 97 % | HEIGHT: 66 IN | WEIGHT: 208 LBS | SYSTOLIC BLOOD PRESSURE: 116 MMHG

## 2024-06-13 DIAGNOSIS — B37.31 VAGINAL CANDIDA: ICD-10-CM

## 2024-06-13 DIAGNOSIS — Z12.31 ENCOUNTER FOR SCREENING MAMMOGRAM FOR BREAST CANCER: ICD-10-CM

## 2024-06-13 DIAGNOSIS — R35.0 URINARY FREQUENCY: Primary | ICD-10-CM

## 2024-06-13 LAB
SL AMB  POCT GLUCOSE, UA: NEGATIVE
SL AMB LEUKOCYTE ESTERASE,UA: NORMAL
SL AMB POCT BILIRUBIN,UA: NEGATIVE
SL AMB POCT BLOOD,UA: NEGATIVE
SL AMB POCT CLARITY,UA: NORMAL
SL AMB POCT COLOR,UA: YELLOW
SL AMB POCT KETONES,UA: NEGATIVE
SL AMB POCT NITRITE,UA: NEGATIVE
SL AMB POCT PH,UA: 6
SL AMB POCT SPECIFIC GRAVITY,UA: 1.01
SL AMB POCT URINE PROTEIN: NEGATIVE
SL AMB POCT UROBILINOGEN: NORMAL

## 2024-06-13 PROCEDURE — 99213 OFFICE O/P EST LOW 20 MIN: CPT | Performed by: INTERNAL MEDICINE

## 2024-06-13 PROCEDURE — 87086 URINE CULTURE/COLONY COUNT: CPT | Performed by: INTERNAL MEDICINE

## 2024-06-13 PROCEDURE — 87186 SC STD MICRODIL/AGAR DIL: CPT | Performed by: INTERNAL MEDICINE

## 2024-06-13 PROCEDURE — 81003 URINALYSIS AUTO W/O SCOPE: CPT | Performed by: INTERNAL MEDICINE

## 2024-06-13 PROCEDURE — 87077 CULTURE AEROBIC IDENTIFY: CPT | Performed by: INTERNAL MEDICINE

## 2024-06-13 RX ORDER — FLUCONAZOLE 150 MG/1
150 TABLET ORAL ONCE
Qty: 1 TABLET | Refills: 0 | Status: SHIPPED | OUTPATIENT
Start: 2024-06-13 | End: 2024-06-13

## 2024-06-13 RX ORDER — CEPHALEXIN 500 MG/1
500 CAPSULE ORAL 3 TIMES DAILY
Qty: 21 CAPSULE | Refills: 0 | Status: SHIPPED | OUTPATIENT
Start: 2024-06-13 | End: 2024-06-20

## 2024-06-13 NOTE — PROGRESS NOTES
Assessment/Plan:    Diagnoses and all orders for this visit:    Urinary frequency  -     POCT urine dip  -     cephalexin (KEFLEX) 500 mg capsule; Take 1 capsule (500 mg total) by mouth 3 (three) times a day for 7 days    Encounter for screening mammogram for breast cancer  -     Mammo screening bilateral w 3d & cad; Future    Vaginal candida  -     fluconazole (DIFLUCAN) 150 mg tablet; Take 1 tablet (150 mg total) by mouth once for 1 dose    Burning, frequency last few days, no fever chills or flank pain  UA in the office with [facility], negative for nitrites will send for urine culture         There are no Patient Instructions on file for this visit.    Subjective:      Patient ID: Radha Sue is a 53 y.o. female    HPI      Current Outpatient Medications:     Calcium 600-10 MG-MCG CHEW, Chew, Disp: , Rfl:     cephalexin (KEFLEX) 500 mg capsule, Take 1 capsule (500 mg total) by mouth 3 (three) times a day for 7 days, Disp: 21 capsule, Rfl: 0    fluconazole (DIFLUCAN) 150 mg tablet, Take 1 tablet (150 mg total) by mouth once for 1 dose, Disp: 1 tablet, Rfl: 0    Glucosamine HCl (GLUCOSAMINE PO), Take by mouth, Disp: , Rfl:     levothyroxine 50 mcg tablet, TAKE 1 TABLET (50 MCG) DAILY 30 MINUTES BEFORE BREAKFAST. TAKE 2 TABLETS (100 MCG) ONLY MONDAYS, Disp: 103 tablet, Rfl: 1    MAGNESIUM PO, Take by mouth, Disp: , Rfl:     MILK THISTLE PO, Take by mouth if needed, Disp: , Rfl:     multivitamin (THERAGRAN) TABS, Take 1 tablet by mouth daily, Disp: , Rfl:     NON FORMULARY, Take 1 capsule by mouth, Disp: , Rfl:     Omega-3 Fatty Acids (FISH OIL PO), Take by mouth, Disp: , Rfl:      Past Medical History:   Diagnosis Date    BV (bacterial vaginosis)     Depression     Disease of thyroid gland     Eczema     Migraine     TIA (transient ischemic attack)          Past Surgical History:   Procedure Laterality Date    BACK SURGERY  2010    lower back - nerve cauterized     OTHER SURGICAL HISTORY      coil inserted  into left fallopian tube    SALPINGECTOMY Right     TONSILLECTOMY      TONSILLECTOMY AND ADENOIDECTOMY      WISDOM TOOTH EXTRACTION  10/2013         No Known Allergies    Recent Results (from the past 1008 hour(s))   POCT urine dip    Collection Time: 06/13/24  4:40 PM   Result Value Ref Range    LEUKOCYTE ESTERASE,UA 3+     NITRITE,UA negative     SL AMB POCT UROBILINOGEN 0.2mg/dL     POCT URINE PROTEIN negative      PH,UA 6.0     BLOOD,UA negative     SPECIFIC GRAVITY,UA 1.015     KETONES,UA negative     BILIRUBIN,UA negative     GLUCOSE, UA negative      COLOR,UA yellow     CLARITY,UA cloudy        The following portions of the patient's history were reviewed and updated as appropriate: allergies, current medications, past family history, past medical history, past social history, past surgical history and problem list.     Review of Systems   Constitutional:  Negative for appetite change, chills, diaphoresis, fatigue, fever and unexpected weight change.   Respiratory:  Negative for apnea, cough, choking, chest tightness, shortness of breath, wheezing and stridor.    Cardiovascular:  Negative for chest pain, palpitations and leg swelling.   Gastrointestinal:  Positive for abdominal pain. Negative for abdominal distention, anal bleeding, blood in stool, constipation, diarrhea, nausea and vomiting.   Genitourinary:  Positive for dysuria and frequency. Negative for decreased urine volume, difficulty urinating and urgency.   Musculoskeletal:  Negative for arthralgias, back pain and myalgias.   Neurological:  Negative for dizziness, light-headedness, numbness and headaches.         Objective:      Vitals:    06/13/24 1629   BP: 116/66   Pulse: 74   Temp: 98 °F (36.7 °C)   SpO2: 97%          Physical Exam  Vitals reviewed.   Constitutional:       General: She is not in acute distress.     Appearance: Normal appearance. She is not ill-appearing, toxic-appearing or diaphoretic.   HENT:      Mouth/Throat:      Mouth:  Mucous membranes are moist.   Cardiovascular:      Rate and Rhythm: Normal rate and regular rhythm.      Pulses: Normal pulses.      Heart sounds: Normal heart sounds. No murmur heard.     No friction rub. No gallop.   Pulmonary:      Effort: Pulmonary effort is normal. No respiratory distress.      Breath sounds: Normal breath sounds. No stridor. No wheezing, rhonchi or rales.   Chest:      Chest wall: No tenderness.   Abdominal:      General: There is no distension.      Palpations: Abdomen is soft.      Tenderness: There is no abdominal tenderness. There is no right CVA tenderness, left CVA tenderness or guarding.   Musculoskeletal:      Right lower leg: No edema.      Left lower leg: No edema.   Skin:     General: Skin is warm and dry.      Findings: No lesion or rash.   Neurological:      Mental Status: She is alert.

## 2024-06-16 LAB
BACTERIA UR CULT: ABNORMAL
BACTERIA UR CULT: ABNORMAL

## 2024-07-29 ENCOUNTER — TELEPHONE (OUTPATIENT)
Age: 54
End: 2024-07-29

## 2024-07-29 NOTE — TELEPHONE ENCOUNTER
Pt has upcoming appt on 8/28/24 with Dr Moreira and would like to know if she can get a Shingles vaccine and Flu vaccine at that appt. . Please contact pt wit that information

## 2024-08-28 ENCOUNTER — OFFICE VISIT (OUTPATIENT)
Dept: INTERNAL MEDICINE CLINIC | Facility: CLINIC | Age: 54
End: 2024-08-28
Payer: COMMERCIAL

## 2024-08-28 VITALS
WEIGHT: 207 LBS | DIASTOLIC BLOOD PRESSURE: 58 MMHG | TEMPERATURE: 98.7 F | BODY MASS INDEX: 33.27 KG/M2 | SYSTOLIC BLOOD PRESSURE: 122 MMHG | HEART RATE: 79 BPM | OXYGEN SATURATION: 98 % | HEIGHT: 66 IN

## 2024-08-28 DIAGNOSIS — E03.9 ACQUIRED HYPOTHYROIDISM: ICD-10-CM

## 2024-08-28 DIAGNOSIS — Z76.89 RETURN TO WORK EXAM: Primary | ICD-10-CM

## 2024-08-28 PROCEDURE — 99213 OFFICE O/P EST LOW 20 MIN: CPT | Performed by: INTERNAL MEDICINE

## 2024-08-28 NOTE — PROGRESS NOTES
Assessment/Plan:             1. Return to work exam  2. Acquired hypothyroidism  Comments:  Continue same medication         Subjective:      Patient ID: Radha Sue is a 53 y.o. female.    Need exam for work        The following portions of the patient's history were reviewed and updated as appropriate: She  has a past medical history of BV (bacterial vaginosis), Depression, Disease of thyroid gland, Eczema, Migraine, and TIA (transient ischemic attack).  She   Patient Active Problem List    Diagnosis Date Noted    Class 1 obesity due to excess calories without serious comorbidity with body mass index (BMI) of 33.0 to 33.9 in adult 09/27/2023    Pelvic pain history of Essure 05/08/2023    Smoking greater than 30 pack years 05/21/2021    Acquired hypothyroidism 05/21/2021    Human papilloma virus (HPV) DNA test positive 10/20/2020    Urinary frequency 07/23/2019    Leukorrhea 07/23/2019    Encounter for screening mammogram for breast cancer 07/02/2019    Encntr for gyn exam (general) (routine) w abnormal findings 07/02/2019    ADRIANA (stress urinary incontinence, female) 07/02/2019    BMI 31.0-31.9,adult 07/02/2019    BV (bacterial vaginosis) 04/23/2019     She  has a past surgical history that includes Tonsillectomy; Salpingectomy (Right); Other surgical history; [place] tooth extraction (10/2013); Tonsillectomy and adenoidectomy; and Back surgery (2010).  Her family history includes Alzheimer's disease in her maternal grandmother; Breast cancer in her family; Breast cancer (age of onset: 65) in her mother; Diabetes in her family; Fibroids in her sister; Hypertension in her family; Lung cancer in her family and father; Melanoma in her mother; Multiple myeloma in her mother; Psychiatric Illness in her family; Thyroid disease in her family.  She  reports that she quit smoking about 5 years ago. Her smoking use included cigarettes. She started smoking about 35 years ago. She has a 30 pack-year smoking history. She  has never used smokeless tobacco. She reports current alcohol use of about 5.0 standard drinks of alcohol per week. She reports that she does not use drugs.  Current Outpatient Medications   Medication Sig Dispense Refill    Calcium 600-10 MG-MCG CHEW Chew      Glucosamine HCl (GLUCOSAMINE PO) Take by mouth      levothyroxine 50 mcg tablet TAKE 1 TABLET (50 MCG) DAILY 30 MINUTES BEFORE BREAKFAST. TAKE 2 TABLETS (100 MCG) ONLY MONDAYS 103 tablet 1    MAGNESIUM PO Take by mouth      MILK THISTLE PO Take by mouth if needed      multivitamin (THERAGRAN) TABS Take 1 tablet by mouth daily      NON FORMULARY Take 1 capsule by mouth      Omega-3 Fatty Acids (FISH OIL PO) Take by mouth       No current facility-administered medications for this visit.     Current Outpatient Medications on File Prior to Visit   Medication Sig    Calcium 600-10 MG-MCG CHEW Chew    Glucosamine HCl (GLUCOSAMINE PO) Take by mouth    levothyroxine 50 mcg tablet TAKE 1 TABLET (50 MCG) DAILY 30 MINUTES BEFORE BREAKFAST. TAKE 2 TABLETS (100 MCG) ONLY MONDAYS    MAGNESIUM PO Take by mouth    MILK THISTLE PO Take by mouth if needed    multivitamin (THERAGRAN) TABS Take 1 tablet by mouth daily    NON FORMULARY Take 1 capsule by mouth    Omega-3 Fatty Acids (FISH OIL PO) Take by mouth     No current facility-administered medications on file prior to visit.     She has No Known Allergies..    Review of Systems   Constitutional:  Negative for chills and fever.   HENT:  Negative for congestion, ear pain and sore throat.    Eyes:  Negative for pain.   Respiratory:  Negative for cough and shortness of breath.    Cardiovascular:  Negative for chest pain and leg swelling.   Gastrointestinal:  Negative for abdominal pain, nausea and vomiting.   Endocrine: Negative for polyuria.   Genitourinary:  Negative for difficulty urinating, frequency and urgency.   Musculoskeletal:  Positive for back pain. Negative for arthralgias.   Skin:  Negative for rash.  "  Neurological:  Negative for weakness and headaches.   Psychiatric/Behavioral:  Negative for sleep disturbance. The patient is not nervous/anxious.          Objective:      /58 (BP Location: Left arm, Patient Position: Sitting, Cuff Size: Large)   Pulse 79   Temp 98.7 °F (37.1 °C) (Temporal)   Ht 5' 6\" (1.676 m)   Wt 93.9 kg (207 lb)   SpO2 98%   BMI 33.41 kg/m²     No results found for this or any previous visit (from the past 1344 hour(s)).     Physical Exam  Constitutional:       Appearance: Normal appearance.   HENT:      Head: Normocephalic.      Right Ear: Tympanic membrane, ear canal and external ear normal.      Left Ear: Tympanic membrane, ear canal and external ear normal.      Nose: Nose normal. No congestion.      Mouth/Throat:      Mouth: Mucous membranes are moist.      Pharynx: Oropharynx is clear. No oropharyngeal exudate or posterior oropharyngeal erythema.   Eyes:      Extraocular Movements: Extraocular movements intact.      Conjunctiva/sclera: Conjunctivae normal.   Cardiovascular:      Rate and Rhythm: Normal rate and regular rhythm.      Heart sounds: Normal heart sounds. No murmur heard.  Pulmonary:      Effort: Pulmonary effort is normal.      Breath sounds: Normal breath sounds. No wheezing or rales.   Abdominal:      General: Abdomen is flat. There is no distension.      Palpations: Abdomen is soft.      Tenderness: There is no abdominal tenderness.   Musculoskeletal:      Cervical back: Normal range of motion and neck supple.      Right lower leg: No edema.      Left lower leg: No edema.   Lymphadenopathy:      Cervical: No cervical adenopathy.   Skin:     General: Skin is warm.   Neurological:      General: No focal deficit present.      Mental Status: She is alert and oriented to person, place, and time.         "

## 2024-11-07 DIAGNOSIS — E03.9 ACQUIRED HYPOTHYROIDISM: ICD-10-CM

## 2024-11-07 RX ORDER — LEVOTHYROXINE SODIUM 50 UG/1
TABLET ORAL
Qty: 103 TABLET | Refills: 1 | Status: SHIPPED | OUTPATIENT
Start: 2024-11-07

## 2024-12-13 ENCOUNTER — TELEPHONE (OUTPATIENT)
Age: 54
End: 2024-12-13

## 2024-12-13 NOTE — TELEPHONE ENCOUNTER
She sk for physical exam, which she had a physical done on 9/27/2032, that is why I explained her we cannot do early because insurance will not cover, and she needed to return to work, I explained to her, and I did return to work,

## 2024-12-13 NOTE — TELEPHONE ENCOUNTER
"Patient had an appointment with Dr. Moreira on August 8th 2024, as it was her yearly physical     Patient had gotten a bill from her insurance company stating that they wont be covering the visit, due to incorrect coding     They had made the diagnosis code something along \"person encountering health services in other specified circumstances\"  which insurance company stated that was vague.    Patient needs this physical resubmitted properly in order for the insurance company to cover     Please reach out to patient for any additional questions, or if the bill was resubmitted.   "

## 2025-01-16 ENCOUNTER — TELEPHONE (OUTPATIENT)
Age: 55
End: 2025-01-16

## 2025-01-16 NOTE — TELEPHONE ENCOUNTER
Please encounter dated back to 12/13/24, patient called back to go over the dx code as she is still receiving outstanding bills from her insurance stating that the dx code that was submitted and changed per providers last notes is still not the correct code.   At this time I advised patient that we should have her situation addressed with billing and hopefully be able to assist her in getting it corrected. Please contact patient at your earliest convenience. Thank you

## 2025-02-06 ENCOUNTER — TELEPHONE (OUTPATIENT)
Dept: INTERNAL MEDICINE CLINIC | Facility: CLINIC | Age: 55
End: 2025-02-06

## 2025-02-12 NOTE — TELEPHONE ENCOUNTER
Called patient she would like to call back and make zarina was in meeting and didn't have time to make it

## 2025-02-17 ENCOUNTER — TELEPHONE (OUTPATIENT)
Dept: INTERNAL MEDICINE CLINIC | Facility: CLINIC | Age: 55
End: 2025-02-17

## 2025-02-17 NOTE — TELEPHONE ENCOUNTER
Patient had called in about a billing issue she had when she saw Dr Moreira , that her ins didn't cover it and stated it was coded wrong , spoke with dr Moreira he changed the code and was sent in with a different code , one she stated always paid her claim for the type visit she has every year . Called pt to let her know

## 2025-03-26 ENCOUNTER — TELEPHONE (OUTPATIENT)
Dept: INTERNAL MEDICINE CLINIC | Facility: CLINIC | Age: 55
End: 2025-03-26

## 2025-03-26 NOTE — TELEPHONE ENCOUNTER
Response received from my email from [facility] that they will resubmit with new code, spoke to pt to let her know

## 2025-03-26 NOTE — TELEPHONE ENCOUNTER
Pt has been calling for a while to get the billing issue and coding done correctly for the office visit on 8/28/24. In the telephone encounter from 12/13/24, it was stated that billing code was changed. Pt states she is still getting the bill for over $150. She called insurance and was told it was never resubmitted    I spoke to  [facility] and was told on our end we needed to contact them either by fax or email to have them resubmit the new code, I have sent an email with new code [id number] for them to resubmit to insurance

## 2025-05-06 DIAGNOSIS — E03.9 ACQUIRED HYPOTHYROIDISM: ICD-10-CM

## 2025-05-07 RX ORDER — LEVOTHYROXINE SODIUM 50 UG/1
TABLET ORAL
Qty: 103 TABLET | Refills: 0 | Status: SHIPPED | OUTPATIENT
Start: 2025-05-07

## 2025-05-15 ENCOUNTER — ANNUAL EXAM (OUTPATIENT)
Dept: OBGYN CLINIC | Facility: MEDICAL CENTER | Age: 55
End: 2025-05-15

## 2025-05-15 VITALS
HEIGHT: 66 IN | WEIGHT: 211 LBS | BODY MASS INDEX: 33.91 KG/M2 | DIASTOLIC BLOOD PRESSURE: 80 MMHG | SYSTOLIC BLOOD PRESSURE: 128 MMHG

## 2025-05-15 DIAGNOSIS — Z12.4 ENCOUNTER FOR SCREENING FOR CERVICAL CANCER: ICD-10-CM

## 2025-05-15 DIAGNOSIS — Z12.31 ENCOUNTER FOR SCREENING MAMMOGRAM FOR BREAST CANCER: ICD-10-CM

## 2025-05-15 DIAGNOSIS — Z01.419 ENCNTR FOR GYN EXAM (GENERAL) (ROUTINE) W/O ABN FINDINGS: Primary | ICD-10-CM

## 2025-05-15 DIAGNOSIS — R87.810 CYTOLOGY EXAMINATION POSITIVE FOR HIGH RISK HUMAN PAPILLOMAVIRUS (HPV): ICD-10-CM

## 2025-05-15 PROCEDURE — G0476 HPV COMBO ASSAY CA SCREEN: HCPCS | Performed by: NURSE PRACTITIONER

## 2025-05-15 PROCEDURE — G0145 SCR C/V CYTO,THINLAYER,RESCR: HCPCS | Performed by: NURSE PRACTITIONER

## 2025-05-15 NOTE — PATIENT INSTRUCTIONS
Calcium 1000 mg (in divided doses-max 600 mg at one time) + 600-1000 IU Vit D daily.   Exercise 150-300 minutes per week minimum including weight bearing exercises.   Pap with high risk HPV Q 5 years, if normal. Collected today based on prior history and refusal of prior recommended colp.   Call your insurance company to verify coverage prior to completing any ordered tests.   Annual mammogram ordered by pcp and cc'd myself.   Monthly breast self exam recommended.    Colonoscopy-  due 2026     Kegels 20 times twice daily.   Silicone based lubricant with sex. (Use water based lubricant with condoms or sexual toys.)    Vaginal moisturizers twice weekly as needed.   Weight management referral placed as desired.   Dietary Recommendations:  Recommend to avoid skipping any meals. Adequate amount of Macronutrients (minimal 3 meals a day) is necessary to help improve metabolism, satiety and allow for better portion control by decreasing Ghrelin (hunger hormone). Lack of hunger can be suppressed by a hormone called Leptin (full hormone) which can occur from a previous meal or caffeine intake    Protein intake throughout the day can help promote satiety and is necessary for muscle growth/repair. Consider ingesting approx 20 gm 3 times per day at meals.    Carbohydrates are essential as it is the vital source of fuel for daily activities. energy, cell function, nutrient absorption, and hormone production.     Fats: Essential vitamins like A, D, and E, support cell growth, function and are necessary for nutrient absorption to support your organs     Fluid intake which is at least half your body weight in ounces is necessary to help control cravings (decreasing confusion for appetite vs water deprivation) as the human body is made up of 50-70% of Fluids. If there is a diversion for water alone, would recommend flavored water (example-splash of lemonade or ice tea) to help promote compliance. Fluids include Teas, water, flavored  water, seltzer water, coffee, shakes.    Ingest approx 25 gm of fiber in your diet daily.     Metabolism:    Metabolism can also be promoted by macronutrient intake and increased muscle weight via thermogenesis     Calcium:  Recommend ingesting 1000 mg of calcium and 400-600 IU of Vit D daily in divided doses as you may only absorb approx 600 mg of calcium at once.      Daily Calorie Needs: Recommend to take into account any fluid losses and calories burned via increased activity levels as daily calories may need to be adjusted     Weight check: Weights can fluctuate depending on fluid shifts vs what foods are consumed prior to checking your weight     Return to office in one year or sooner, if needed.

## 2025-05-15 NOTE — PROGRESS NOTES
Name: Radha Sue      : 1970      MRN: [id number]  Encounter Provider: TITUS Knight  Encounter Date: 5/15/2025   Encounter department: [facility] [place]  :  Assessment & Plan  Encntr for gyn exam (general) (routine) w/o abn findings  Calcium 1000 mg (in divided doses-max 600 mg at one time) + 600-1000 IU Vit D daily.   Exercise 150-300 minutes per week minimum including weight bearing exercises.   Pap with high risk HPV Q 5 years, if normal. Collected today based on prior history and refusal of prior recommended colp.   Call your insurance company to verify coverage prior to completing any ordered tests.   Annual mammogram ordered by pcp and cc'd myself.   Monthly breast self exam recommended.    Colonoscopy-  due      Kegels 20 times twice daily.   Silicone based lubricant with sex. (Use water based lubricant with condoms or sexual toys.)    Vaginal moisturizers twice weekly as needed.   Return to office in one year or sooner, if needed.        Encounter for screening for cervical cancer    Orders:    Liquid-based pap, screening    Encounter for screening mammogram for breast cancer  Already ordered by PCP. CC's myself.        Cytology examination positive for high risk human papillomavirus (HPV)    Orders:    Liquid-based pap, screening    BMI 34.0-34.9,adult  Dietary Recommendations:  Recommend to avoid skipping any meals. Adequate amount of Macronutrients (minimal 3 meals a day) is necessary to help improve metabolism, satiety and allow for better portion control by decreasing Ghrelin (hunger hormone). Lack of hunger can be suppressed by a hormone called Leptin (full hormone) which can occur from a previous meal or caffeine intake    Protein intake throughout the day can help promote satiety and is necessary for muscle growth/repair. Consider ingesting approx 20 gm 3 times per day at meals.    Carbohydrates are essential as it is the  vital source of fuel for daily activities. energy, cell function, nutrient absorption, and hormone production.     Fats: Essential vitamins like A, D, and E, support cell growth, function and are necessary for nutrient absorption to support your organs     Fluid intake which is at least half your body weight in ounces is necessary to help control cravings (decreasing confusion for appetite vs water deprivation) as the human body is made up of 50-70% of Fluids. If there is a diversion for water alone, would recommend flavored water (example-splash of lemonade or ice tea) to help promote compliance. Fluids include Teas, water, flavored water, seltzer water, coffee, shakes.    Ingest approx 25 gm of fiber in your diet daily.     Metabolism:    Metabolism can also be promoted by macronutrient intake and increased muscle weight via thermogenesis     Calcium:  Recommend ingesting 1000 mg of calcium and 400-600 IU of Vit D daily in divided doses as you may only absorb approx 600 mg of calcium at once.      Daily Calorie Needs: Recommend to take into account any fluid losses and calories burned via increased activity levels as daily calories may need to be adjusted     Weight check: Weights can fluctuate depending on fluid shifts vs what foods are consumed prior to checking your weight     Orders:    Ambulatory Referral to Weight Management; Future        History of Present Illness   HPI  Radha Sue is a 54 y.o.  female who is here today for her annual visit. No gynecologic health concerns.   Arrived 13 minutes after appt start time.   Hx of TIA at age 25.   Last in office on 24 for irregular menses.   25 pelvic US ordered by PCP for pelvic pain-> endometrial complex of 15 mm.   Bilateral essure devices in place.This was a shock to her as she thought one was removed.    Simple bilateral ovarian cysts.     LMP .   Exercises- 5 times per week. Quit drinking to help with weight loss.   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 Monogamous and feels safe in this relationship. Denies vaginal pain,bleeding or dryness.    She uses essure  for contraception.    She is not interested in STD screening today.   She denies vaginal discharge, itching or pelvic pain.   She has no urinary concerns, does not have incontinence.  No bowel concerns.  No breast concerns.     Last pap:   7/2/19 normal pap with HR HPV 18  10/20/20 normal with + positive HR HPV  18; declined colp  03/28/2023 normal with negative HR HPV   Mammogram: Not on file ; refuses due to bad experience during a previous mammogram when she passed out.  Mother had breast cancer at age 65. No BRCA testing.   Colonoscopy: 1/9/23 normal cologard.   DEXA scan: Not on file      Family history of cancer:   Cancer-related family history includes Breast cancer in her family; Breast cancer (age of onset: 65) in her mother; Lung cancer in her family and father; Melanoma in her mother.    History obtained from: patient    Review of Systems   Constitutional: Negative.  Negative for activity change, appetite change, chills, diaphoresis, fatigue, fever and unexpected weight change.        Difficulty losing weight   HENT:  Negative for congestion, dental problem, sneezing, sore throat and trouble swallowing.    Eyes:  Negative for visual disturbance.   Respiratory:  Negative for chest tightness and shortness of breath.    Cardiovascular:  Negative for chest pain and leg swelling.   Gastrointestinal:  Negative for abdominal pain, constipation, diarrhea, nausea and vomiting.   Genitourinary:  Negative for difficulty urinating, dyspareunia, dysuria, frequency, hematuria, menstrual problem, pelvic pain, urgency, vaginal bleeding, vaginal discharge and vaginal pain.   Musculoskeletal:  Negative for back pain and neck pain.   Skin: Negative.    Allergic/Immunologic: Negative.    Neurological:  Negative for weakness and  "headaches.   Hematological:  Negative for adenopathy.   Psychiatric/Behavioral: Negative.       Medical History Reviewed by provider this encounter:  Tobacco  Allergies  Meds  Problems  Med Hx  Surg Hx  Fam Hx     .  Medications Ordered Prior to Encounter[1]   Social History     Tobacco Use    Smoking status: Former     Current packs/day: 0.00     Average packs/day: 1 pack/day for 30.0 years (30.0 ttl pk-yrs)     Types: Cigarettes     Start date: 1989     Quit date: 2019     Years since quittin.3    Smokeless tobacco: Never   Vaping Use    Vaping status: Never Used   Substance and Sexual Activity    Alcohol use: Yes     Alcohol/week: 5.0 standard drinks of alcohol     Types: 5 Glasses of wine per week     Comment: Social    Drug use: Never    Sexual activity: Yes     Partners: Male     Birth control/protection: Female Sterilization        Objective   /80 (BP Location: Left arm, Patient Position: Sitting, Cuff Size: Standard)   Ht 5' 6\" (1.676 m)   Wt 95.7 kg (211 lb)   BMI 34.06 kg/m²      Physical Exam  Vitals and nursing note reviewed.   Constitutional:       Appearance: Normal appearance. She is well-developed.      Comments: BMI 34   HENT:      Head: Normocephalic and atraumatic.     Eyes:      General:         Right eye: No discharge.         Left eye: No discharge.     Neck:      Thyroid: No thyromegaly.      Trachea: Trachea normal.     Cardiovascular:      Rate and Rhythm: Normal rate and regular rhythm.      Heart sounds: Normal heart sounds.   Pulmonary:      Effort: Pulmonary effort is normal.      Breath sounds: Normal breath sounds.   Chest:   Breasts:     Breasts are symmetrical.      Right: Normal. No inverted nipple, mass, nipple discharge, skin change or tenderness.      Left: Normal. No inverted nipple, mass, nipple discharge, skin change or tenderness.   Abdominal:      Palpations: Abdomen is soft.   Genitourinary:     General: Normal vulva.      Exam position: " Lithotomy position.      Labia:         Right: No rash, tenderness, lesion or injury.         Left: No rash, tenderness, lesion or injury.       Urethra: No prolapse, urethral pain, urethral swelling or urethral lesion.      Vagina: Normal. No signs of injury and foreign body. No vaginal discharge, erythema, tenderness or bleeding.      Cervix: Normal.      Uterus: Normal.       Adnexa:         Right: No mass, tenderness or fullness.          Left: No mass, tenderness or fullness.        Rectum: No external hemorrhoid.      Comments: guarded during pelvic exam.     Musculoskeletal:         General: Normal range of motion.      Cervical back: Normal range of motion and neck supple.   Lymphadenopathy:      Head:      Right side of head: No submental, submandibular or tonsillar adenopathy.      Left side of head: No submental, submandibular or tonsillar adenopathy.      Cervical: No cervical adenopathy.      Upper Body:      Right upper body: No supraclavicular or axillary adenopathy.      Left upper body: No supraclavicular or axillary adenopathy.      Lower Body: No right inguinal adenopathy. No left inguinal adenopathy.     Skin:     General: Skin is warm and dry.     Neurological:      Mental Status: She is alert and oriented to person, place, and time.     Psychiatric:         Mood and Affect: Mood normal.         Behavior: Behavior normal.                [1]   Current Outpatient Medications on File Prior to Visit   Medication Sig Dispense Refill    Calcium 600-10 MG-MCG CHEW Chew      Glucosamine HCl (GLUCOSAMINE PO) Take by mouth      levothyroxine 50 mcg tablet TAKE 1 TABLET (50 MCG) DAILY 30 MINUTES BEFORE BREAKFAST. TAKE 2 TABLETS (100 MCG) ONLY MONDAYS 103 tablet 0    MAGNESIUM PO Take by mouth      MILK THISTLE PO Take by mouth if needed      multivitamin (THERAGRAN) TABS Take 1 tablet by mouth in the morning.      NON FORMULARY Take 1 capsule by mouth      Omega-3 Fatty Acids (FISH OIL PO) Take by mouth        No current facility-administered medications on file prior to visit.

## 2025-05-15 NOTE — ASSESSMENT & PLAN NOTE
Already ordered by PCP. CC's myself.         My chart message has been sent to the patient for PATIENT ROUNDING with [facility] [place].

## 2025-05-18 ENCOUNTER — RESULTS FOLLOW-UP (OUTPATIENT)
Dept: OBGYN CLINIC | Facility: CLINIC | Age: 55
End: 2025-05-18

## 2025-05-21 LAB
LAB AP GYN PRIMARY INTERPRETATION: NORMAL
Lab: NORMAL

## 2025-08-04 DIAGNOSIS — E03.9 ACQUIRED HYPOTHYROIDISM: ICD-10-CM

## 2025-08-06 RX ORDER — LEVOTHYROXINE SODIUM 50 UG/1
TABLET ORAL
Qty: 103 TABLET | Refills: 3 | OUTPATIENT
Start: 2025-08-06